# Patient Record
Sex: MALE | Race: WHITE | NOT HISPANIC OR LATINO | Employment: UNEMPLOYED | URBAN - METROPOLITAN AREA
[De-identification: names, ages, dates, MRNs, and addresses within clinical notes are randomized per-mention and may not be internally consistent; named-entity substitution may affect disease eponyms.]

---

## 2024-09-12 ENCOUNTER — HOSPITAL ENCOUNTER (OUTPATIENT)
Facility: HOSPITAL | Age: 59
Setting detail: OBSERVATION
Discharge: HOME | End: 2024-09-14
Attending: EMERGENCY MEDICINE | Admitting: INTERNAL MEDICINE
Payer: COMMERCIAL

## 2024-09-12 ENCOUNTER — APPOINTMENT (OUTPATIENT)
Dept: CT IMAGING | Facility: HOSPITAL | Age: 59
End: 2024-09-12
Payer: COMMERCIAL

## 2024-09-12 DIAGNOSIS — Z99.2 ESRD ON HEMODIALYSIS: ICD-10-CM

## 2024-09-12 DIAGNOSIS — G57.93 NEUROPATHY INVOLVING BOTH LOWER EXTREMITIES: ICD-10-CM

## 2024-09-12 DIAGNOSIS — N18.6 ESRD ON HEMODIALYSIS: ICD-10-CM

## 2024-09-12 DIAGNOSIS — R29.6 FREQUENT FALLS: Primary | ICD-10-CM

## 2024-09-12 DIAGNOSIS — M54.41 ACUTE MIDLINE LOW BACK PAIN WITH BILATERAL SCIATICA: ICD-10-CM

## 2024-09-12 DIAGNOSIS — M54.42 ACUTE MIDLINE LOW BACK PAIN WITH BILATERAL SCIATICA: ICD-10-CM

## 2024-09-12 LAB
ALBUMIN SERPL-MCNC: 2.9 G/DL (ref 3.5–5.2)
ALBUMIN/GLOB SERPL: 1.2 G/DL
ALP SERPL-CCNC: 142 U/L (ref 39–117)
ALT SERPL W P-5'-P-CCNC: 10 U/L (ref 1–41)
ANION GAP SERPL CALCULATED.3IONS-SCNC: 17.3 MMOL/L (ref 5–15)
AST SERPL-CCNC: 7 U/L (ref 1–40)
BASOPHILS # BLD AUTO: 0.04 10*3/MM3 (ref 0–0.2)
BASOPHILS NFR BLD AUTO: 0.8 % (ref 0–1.5)
BILIRUB SERPL-MCNC: 0.2 MG/DL (ref 0–1.2)
BUN SERPL-MCNC: 34 MG/DL (ref 6–20)
BUN/CREAT SERPL: 2 (ref 7–25)
CALCIUM SPEC-SCNC: 8.1 MG/DL (ref 8.6–10.5)
CHLORIDE SERPL-SCNC: 115 MMOL/L (ref 98–107)
CK SERPL-CCNC: 113 U/L (ref 20–200)
CO2 SERPL-SCNC: 12.7 MMOL/L (ref 22–29)
CREAT SERPL-MCNC: 16.88 MG/DL (ref 0.76–1.27)
DEPRECATED RDW RBC AUTO: 67.2 FL (ref 37–54)
EGFRCR SERPLBLD CKD-EPI 2021: 3 ML/MIN/1.73
EOSINOPHIL # BLD AUTO: 0.18 10*3/MM3 (ref 0–0.4)
EOSINOPHIL NFR BLD AUTO: 3.6 % (ref 0.3–6.2)
ERYTHROCYTE [DISTWIDTH] IN BLOOD BY AUTOMATED COUNT: 18.7 % (ref 12.3–15.4)
GLOBULIN UR ELPH-MCNC: 2.5 GM/DL
GLUCOSE SERPL-MCNC: 82 MG/DL (ref 65–99)
HCT VFR BLD AUTO: 32.6 % (ref 37.5–51)
HGB BLD-MCNC: 10.4 G/DL (ref 13–17.7)
IMM GRANULOCYTES # BLD AUTO: 0.01 10*3/MM3 (ref 0–0.05)
IMM GRANULOCYTES NFR BLD AUTO: 0.2 % (ref 0–0.5)
LYMPHOCYTES # BLD AUTO: 0.98 10*3/MM3 (ref 0.7–3.1)
LYMPHOCYTES NFR BLD AUTO: 19.8 % (ref 19.6–45.3)
MCH RBC QN AUTO: 31.7 PG (ref 26.6–33)
MCHC RBC AUTO-ENTMCNC: 31.9 G/DL (ref 31.5–35.7)
MCV RBC AUTO: 99.4 FL (ref 79–97)
MONOCYTES # BLD AUTO: 0.28 10*3/MM3 (ref 0.1–0.9)
MONOCYTES NFR BLD AUTO: 5.7 % (ref 5–12)
NEUTROPHILS NFR BLD AUTO: 3.46 10*3/MM3 (ref 1.7–7)
NEUTROPHILS NFR BLD AUTO: 69.9 % (ref 42.7–76)
NRBC BLD AUTO-RTO: 0 /100 WBC (ref 0–0.2)
PLATELET # BLD AUTO: 72 10*3/MM3 (ref 140–450)
PMV BLD AUTO: 9.2 FL (ref 6–12)
POTASSIUM SERPL-SCNC: 5.5 MMOL/L (ref 3.5–5.2)
PROT SERPL-MCNC: 5.4 G/DL (ref 6–8.5)
RBC # BLD AUTO: 3.28 10*6/MM3 (ref 4.14–5.8)
SODIUM SERPL-SCNC: 145 MMOL/L (ref 136–145)
WBC NRBC COR # BLD AUTO: 4.95 10*3/MM3 (ref 3.4–10.8)

## 2024-09-12 PROCEDURE — 36415 COLL VENOUS BLD VENIPUNCTURE: CPT | Performed by: PHYSICIAN ASSISTANT

## 2024-09-12 PROCEDURE — 99285 EMERGENCY DEPT VISIT HI MDM: CPT

## 2024-09-12 PROCEDURE — 72131 CT LUMBAR SPINE W/O DYE: CPT

## 2024-09-12 PROCEDURE — 82550 ASSAY OF CK (CPK): CPT | Performed by: PHYSICIAN ASSISTANT

## 2024-09-12 PROCEDURE — 80053 COMPREHEN METABOLIC PANEL: CPT | Performed by: PHYSICIAN ASSISTANT

## 2024-09-12 PROCEDURE — G0378 HOSPITAL OBSERVATION PER HR: HCPCS

## 2024-09-12 PROCEDURE — 85025 COMPLETE CBC W/AUTO DIFF WBC: CPT | Performed by: PHYSICIAN ASSISTANT

## 2024-09-12 PROCEDURE — 83036 HEMOGLOBIN GLYCOSYLATED A1C: CPT | Performed by: INTERNAL MEDICINE

## 2024-09-12 RX ORDER — HYDROCODONE BITARTRATE AND ACETAMINOPHEN 5; 325 MG/1; MG/1
1 TABLET ORAL EVERY 6 HOURS PRN
Status: DISCONTINUED | OUTPATIENT
Start: 2024-09-12 | End: 2024-09-14 | Stop reason: HOSPADM

## 2024-09-12 RX ORDER — LOPERAMIDE HCL 2 MG
2 CAPSULE ORAL 4 TIMES DAILY PRN
Status: DISCONTINUED | OUTPATIENT
Start: 2024-09-12 | End: 2024-09-14 | Stop reason: HOSPADM

## 2024-09-12 RX ORDER — BISACODYL 5 MG/1
5 TABLET, DELAYED RELEASE ORAL DAILY PRN
Status: DISCONTINUED | OUTPATIENT
Start: 2024-09-12 | End: 2024-09-14 | Stop reason: HOSPADM

## 2024-09-12 RX ORDER — SODIUM BICARBONATE 650 MG/1
650 TABLET ORAL
COMMUNITY
End: 2024-09-14 | Stop reason: HOSPADM

## 2024-09-12 RX ORDER — MIDODRINE HYDROCHLORIDE 5 MG/1
5 TABLET ORAL 3 TIMES DAILY
COMMUNITY
End: 2024-09-14 | Stop reason: HOSPADM

## 2024-09-12 RX ORDER — SODIUM CHLORIDE 0.9 % (FLUSH) 0.9 %
3 SYRINGE (ML) INJECTION EVERY 12 HOURS SCHEDULED
Status: DISCONTINUED | OUTPATIENT
Start: 2024-09-12 | End: 2024-09-14 | Stop reason: HOSPADM

## 2024-09-12 RX ORDER — CIMETIDINE 200 MG
TABLET ORAL
COMMUNITY
Start: 2024-08-27 | End: 2024-09-14 | Stop reason: HOSPADM

## 2024-09-12 RX ORDER — OXYCODONE AND ACETAMINOPHEN 5; 325 MG/1; MG/1
1 TABLET ORAL ONCE
Status: COMPLETED | OUTPATIENT
Start: 2024-09-12 | End: 2024-09-12

## 2024-09-12 RX ORDER — SODIUM CHLORIDE 0.9 % (FLUSH) 0.9 %
3-10 SYRINGE (ML) INJECTION AS NEEDED
Status: DISCONTINUED | OUTPATIENT
Start: 2024-09-12 | End: 2024-09-14 | Stop reason: HOSPADM

## 2024-09-12 RX ORDER — AMOXICILLIN 250 MG
2 CAPSULE ORAL 2 TIMES DAILY
Status: DISCONTINUED | OUTPATIENT
Start: 2024-09-12 | End: 2024-09-14 | Stop reason: HOSPADM

## 2024-09-12 RX ORDER — ONDANSETRON 4 MG/1
4 TABLET, ORALLY DISINTEGRATING ORAL EVERY 6 HOURS PRN
Status: DISCONTINUED | OUTPATIENT
Start: 2024-09-12 | End: 2024-09-14 | Stop reason: HOSPADM

## 2024-09-12 RX ORDER — ONDANSETRON 2 MG/ML
4 INJECTION INTRAMUSCULAR; INTRAVENOUS EVERY 6 HOURS PRN
Status: DISCONTINUED | OUTPATIENT
Start: 2024-09-12 | End: 2024-09-14 | Stop reason: HOSPADM

## 2024-09-12 RX ORDER — POTASSIUM CHLORIDE 750 MG/1
10 TABLET, EXTENDED RELEASE ORAL
COMMUNITY
Start: 2024-08-26 | End: 2024-09-14 | Stop reason: HOSPADM

## 2024-09-12 RX ORDER — LIDOCAINE 40 MG/G
CREAM TOPICAL
Status: ON HOLD | COMMUNITY
Start: 2024-09-09 | End: 2024-09-13

## 2024-09-12 RX ORDER — FAMOTIDINE 20 MG/1
10 TABLET, FILM COATED ORAL 2 TIMES DAILY PRN
Status: DISCONTINUED | OUTPATIENT
Start: 2024-09-12 | End: 2024-09-14 | Stop reason: HOSPADM

## 2024-09-12 RX ORDER — GABAPENTIN 300 MG/1
CAPSULE ORAL
Status: ON HOLD | COMMUNITY
Start: 2024-09-10 | End: 2024-09-13

## 2024-09-12 RX ORDER — ENOXAPARIN SODIUM 100 MG/ML
30 INJECTION SUBCUTANEOUS DAILY
Status: DISCONTINUED | OUTPATIENT
Start: 2024-09-13 | End: 2024-09-13

## 2024-09-12 RX ORDER — GABAPENTIN 100 MG/1
CAPSULE ORAL
COMMUNITY
Start: 2024-08-28

## 2024-09-12 RX ORDER — ACETAMINOPHEN 650 MG/1
650 SUPPOSITORY RECTAL EVERY 4 HOURS PRN
Status: DISCONTINUED | OUTPATIENT
Start: 2024-09-12 | End: 2024-09-14 | Stop reason: HOSPADM

## 2024-09-12 RX ORDER — ACETAMINOPHEN 325 MG/1
650 TABLET ORAL EVERY 4 HOURS PRN
Status: DISCONTINUED | OUTPATIENT
Start: 2024-09-12 | End: 2024-09-14 | Stop reason: HOSPADM

## 2024-09-12 RX ORDER — ACETAMINOPHEN 160 MG/5ML
650 SOLUTION ORAL EVERY 4 HOURS PRN
Status: DISCONTINUED | OUTPATIENT
Start: 2024-09-12 | End: 2024-09-14 | Stop reason: HOSPADM

## 2024-09-12 RX ORDER — POLYETHYLENE GLYCOL 3350 17 G/17G
17 POWDER, FOR SOLUTION ORAL DAILY PRN
Status: DISCONTINUED | OUTPATIENT
Start: 2024-09-12 | End: 2024-09-14 | Stop reason: HOSPADM

## 2024-09-12 RX ORDER — BISACODYL 10 MG
10 SUPPOSITORY, RECTAL RECTAL DAILY PRN
Status: DISCONTINUED | OUTPATIENT
Start: 2024-09-12 | End: 2024-09-14 | Stop reason: HOSPADM

## 2024-09-12 RX ORDER — SODIUM CHLORIDE 9 MG/ML
40 INJECTION, SOLUTION INTRAVENOUS AS NEEDED
Status: DISCONTINUED | OUTPATIENT
Start: 2024-09-12 | End: 2024-09-14 | Stop reason: HOSPADM

## 2024-09-12 RX ADMIN — OXYCODONE HYDROCHLORIDE AND ACETAMINOPHEN 1 TABLET: 5; 325 TABLET ORAL at 19:00

## 2024-09-12 RX ADMIN — Medication 3 ML: at 23:11

## 2024-09-12 NOTE — ED PROVIDER NOTES
EMERGENCY DEPARTMENT ENCOUNTER  Room Number:  S04/04  PCP: Zahida Sanchez APRN  Independent Historians: Patient      HPI:  Chief Complaint: had concerns including Foot Pain.     A complete HPI/ROS/PMH/PSH/SH/FH are unobtainable due to: None    Chronic or social conditions impacting patient care (Social Determinants of Health): None      Context: The patient is a 58 y.o. male with a medical history of ESRD on dialysis, diabetes who presents to the ED c/o acute bilateral lower extremity pain and back pain.  He states that has been present for a couple of weeks, is gradually worsening.  He states he is fallen 5 times in the last 3 days, feels he is having weakness in his legs.  He feels a burning icy pain in his feet.  He states he was discharged from another hospital couple days ago and since then he is fallen multiple times and has had no relief of his symptoms.  He goes to dialysis Monday Wednesday Friday, is due for dialysis tomorrow.      Review of prior external notes (non-ED) -and- Review of prior external test results outside of this encounter:  Patient evaluated at [facility] for bilateral lower extremity pain x 3 days on 8/26/2024.  Reported radiate up his legs and into his back and increased pain and tingling with walking.  Hemoglobin 11.8, potassium 2.6, CK was normal, potassium and magnesium replaced.  He was evaluated again on 8/29/2024 at [facility] for bilateral feet pain to lower back.  ABIs were normal.  Repeat labs unremarkable.  Diagnosed with nephropathy, prescribed gabapentin.        PAST MEDICAL HISTORY  Active Ambulatory Problems     Diagnosis Date Noted    No Active Ambulatory Problems     Resolved Ambulatory Problems     Diagnosis Date Noted    No Resolved Ambulatory Problems     Past Medical History:   Diagnosis Date    Diabetes mellitus     ESRD (end stage renal disease) on dialysis     Renal disorder          PAST SURGICAL HISTORY  History reviewed. No pertinent surgical  history.      FAMILY HISTORY  History reviewed. No pertinent family history.      SOCIAL HISTORY  Social History     Socioeconomic History    Marital status: Single         ALLERGIES  Ibuprofen      REVIEW OF SYSTEMS  Review of Systems  Included in HPI  All systems reviewed and negative except for those discussed in HPI.      PHYSICAL EXAM    I have reviewed the triage vital signs and nursing notes.    ED Triage Vitals   Temp Heart Rate Resp BP SpO2   09/12/24 1415 09/12/24 1415 09/12/24 1415 09/12/24 1415 09/12/24 1415   98.5 °F (36.9 °C) 77 16 170/90 100 %      Temp src Heart Rate Source Patient Position BP Location FiO2 (%)   -- 09/12/24 1510 09/12/24 1510 09/12/24 1510 --    Monitor Sitting Right arm        Physical Exam  GENERAL: alert, no acute distress  SKIN: Warm, dry  HENT: Normocephalic, atraumatic  EYES: no scleral icterus  CV: regular rhythm, regular rate  RESPIRATORY: normal effort, lungs clear  ABDOMEN: nondistended soft nontender  MUSCULOSKELETAL: no deformity.Sensation is intact to light touch throughout the bilateral lower extremities. Muscle strength is 5/5 and with hip and knee flexion and extension, 3+/5 and symmetrical with plantarflexion and EHL. Patellar reflexes are 1+ and equal bilaterally. DP and PT pulses are 2+ bilaterally.  Feet are warm, natural color, cap refill brisk  NEURO: alert, moves all extremities, follows commands            LAB RESULTS  Recent Results (from the past 24 hour(s))   Comprehensive Metabolic Panel    Collection Time: 09/12/24  4:33 PM    Specimen: Arm, Right; Blood   Result Value Ref Range    Glucose 82 65 - 99 mg/dL    BUN 34 (H) 6 - 20 mg/dL    Creatinine 16.88 (H) 0.76 - 1.27 mg/dL    Sodium 145 136 - 145 mmol/L    Potassium 5.5 (H) 3.5 - 5.2 mmol/L    Chloride 115 (H) 98 - 107 mmol/L    CO2 12.7 (L) 22.0 - 29.0 mmol/L    Calcium 8.1 (L) 8.6 - 10.5 mg/dL    Total Protein 5.4 (L) 6.0 - 8.5 g/dL    Albumin 2.9 (L) 3.5 - 5.2 g/dL    ALT (SGPT) 10 1 - 41 U/L    AST  (SGOT) 7 1 - 40 U/L    Alkaline Phosphatase 142 (H) 39 - 117 U/L    Total Bilirubin 0.2 0.0 - 1.2 mg/dL    Globulin 2.5 gm/dL    A/G Ratio 1.2 g/dL    BUN/Creatinine Ratio 2.0 (L) 7.0 - 25.0    Anion Gap 17.3 (H) 5.0 - 15.0 mmol/L    eGFR 3.0 (L) >60.0 mL/min/1.73   CK    Collection Time: 09/12/24  4:33 PM    Specimen: Arm, Right; Blood   Result Value Ref Range    Creatine Kinase 113 20 - 200 U/L   CBC Auto Differential    Collection Time: 09/12/24  4:33 PM    Specimen: Arm, Right; Blood   Result Value Ref Range    WBC 4.95 3.40 - 10.80 10*3/mm3    RBC 3.28 (L) 4.14 - 5.80 10*6/mm3    Hemoglobin 10.4 (L) 13.0 - 17.7 g/dL    Hematocrit 32.6 (L) 37.5 - 51.0 %    MCV 99.4 (H) 79.0 - 97.0 fL    MCH 31.7 26.6 - 33.0 pg    MCHC 31.9 31.5 - 35.7 g/dL    RDW 18.7 (H) 12.3 - 15.4 %    RDW-SD 67.2 (H) 37.0 - 54.0 fl    MPV 9.2 6.0 - 12.0 fL    Platelets 72 (L) 140 - 450 10*3/mm3    Neutrophil % 69.9 42.7 - 76.0 %    Lymphocyte % 19.8 19.6 - 45.3 %    Monocyte % 5.7 5.0 - 12.0 %    Eosinophil % 3.6 0.3 - 6.2 %    Basophil % 0.8 0.0 - 1.5 %    Immature Grans % 0.2 0.0 - 0.5 %    Neutrophils, Absolute 3.46 1.70 - 7.00 10*3/mm3    Lymphocytes, Absolute 0.98 0.70 - 3.10 10*3/mm3    Monocytes, Absolute 0.28 0.10 - 0.90 10*3/mm3    Eosinophils, Absolute 0.18 0.00 - 0.40 10*3/mm3    Basophils, Absolute 0.04 0.00 - 0.20 10*3/mm3    Immature Grans, Absolute 0.01 0.00 - 0.05 10*3/mm3    nRBC 0.0 0.0 - 0.2 /100 WBC         RADIOLOGY  No Radiology Exams Resulted Within Past 24 Hours      MEDICATIONS GIVEN IN ER  Medications   oxyCODONE-acetaminophen (PERCOCET) 5-325 MG per tablet 1 tablet (1 tablet Oral Given 9/12/24 1900)         ORDERS PLACED DURING THIS VISIT:  Orders Placed This Encounter   Procedures    CT Lumbar Spine Without Contrast    Comprehensive Metabolic Panel    CK    CBC Auto Differential    [facility] (on-call MD unless specified) Details    CBC & Differential         OUTPATIENT MEDICATION MANAGEMENT:  No current Epic-ordered  facility-administered medications on file.     Current Outpatient Medications Ordered in Epic   Medication Sig Dispense Refill    Antacid Extra Strength 750 MG chewable tablet       gabapentin (NEURONTIN) 100 MG capsule       gabapentin (NEURONTIN) 300 MG capsule       lidocaine (LMX) 4 % cream       potassium chloride 10 MEQ CR tablet Take 1 tablet by mouth.      midodrine (PROAMATINE) 5 MG tablet Take 1 tablet by mouth 3 (Three) Times a Day.      sodium bicarbonate 650 MG tablet Take 1 tablet by mouth.           PROCEDURES  Procedures            PROGRESS, DATA ANALYSIS, CONSULTS, AND MEDICAL DECISION MAKING  All labs have been independently interpreted by me.  All radiology studies have been reviewed by me. All EKG's have been independently viewed and interpreted by me.  Discussion below represents my analysis of pertinent findings related to patient's condition, differential diagnosis, treatment plan and final disposition.    DIFFERENTIAL    My differential diagnosis includes but is not limited to neuropathy, radiculopathy, cauda equina, peripheral vascular disease    Clinical Scores:                  ED Course as of 09/13/24 1552   Thu Sep 12, 2024   1709 Creatine Kinase: 113 [KA]   1843 I discussed patient with Dr. Gunter, hospitalist including history presentation workup.  He would like the patient to have a CT lumbar spine in the emergency department and then call him back [KA]   6211 Dr. Gunter in the department to see patient.  Patient's CT scan with degenerative disc disease.  Initial plan was for MRI lumbar spine in the morning and neurology or neurosurgery consultation [AR]      ED Course User Index  [AR] Miroslava Patel MD  [KA] Claritza Calix PA-C             AS OF 19:53 EDT VITALS:    BP - 152/89  HR - 69  TEMP - 98.5 °F (36.9 °C)  O2 SATS - 100%    COMPLEXITY OF CARE  The patient requires admission.      DIAGNOSIS  Final diagnoses:   Frequent falls   Acute midline low back pain with bilateral  sciatica   Neuropathy involving both lower extremities   ESRD on hemodialysis         DISPOSITION  ED Disposition       ED Disposition   Decision to Admit    Condition   --    Comment   Level of Care: Med/Surg [1]   Diagnosis: Frequent falls [510450]   Admitting Physician: GERMANIA BARBA [[id number]]   Attending Physician: GERMANIA BARBA [[id number]]                          Please note that portions of this document were completed with a voice recognition program.    Note Disclaimer: At [facility], we believe that sharing information builds trust and better relationships. You are receiving this note because you recently visited [facility]. It is possible you will see health information before a provider has talked with you about it. This kind of information can be easy to misunderstand. To help you fully understand what it means for your health, we urge you to discuss this note with your provider.         Claritza Calix PA-C  09/13/24 1551

## 2024-09-13 PROBLEM — D69.6 THROMBOCYTOPENIA: Status: ACTIVE | Noted: 2024-09-13

## 2024-09-13 PROBLEM — E87.20 METABOLIC ACIDOSIS: Status: ACTIVE | Noted: 2024-09-13

## 2024-09-13 PROBLEM — D53.9 MACROCYTIC ANEMIA: Status: ACTIVE | Noted: 2024-09-13

## 2024-09-13 PROBLEM — G62.9 NEUROPATHY: Status: ACTIVE | Noted: 2024-09-13

## 2024-09-13 PROBLEM — E53.8 B12 DEFICIENCY: Status: ACTIVE | Noted: 2024-09-13

## 2024-09-13 PROBLEM — R20.0 BILATERAL LEG NUMBNESS: Status: ACTIVE | Noted: 2024-09-13

## 2024-09-13 PROBLEM — Z86.2 HISTORY OF ANEMIA DUE TO VITAMIN B12 DEFICIENCY: Status: ACTIVE | Noted: 2024-09-13

## 2024-09-13 PROBLEM — R53.1 GENERAL WEAKNESS: Status: ACTIVE | Noted: 2024-09-13

## 2024-09-13 PROBLEM — E87.6 HYPOKALEMIA: Status: ACTIVE | Noted: 2024-09-13

## 2024-09-13 PROBLEM — E87.5 HYPERKALEMIA: Status: ACTIVE | Noted: 2024-09-13

## 2024-09-13 PROBLEM — F10.21 HISTORY OF ALCOHOL DEPENDENCE: Status: ACTIVE | Noted: 2024-09-13

## 2024-09-13 LAB
ANION GAP SERPL CALCULATED.3IONS-SCNC: 22 MMOL/L (ref 5–15)
BUN SERPL-MCNC: 39 MG/DL (ref 6–20)
BUN/CREAT SERPL: 2.3 (ref 7–25)
CALCIUM SPEC-SCNC: 7.4 MG/DL (ref 8.6–10.5)
CHLORIDE SERPL-SCNC: 110 MMOL/L (ref 98–107)
CO2 SERPL-SCNC: 10 MMOL/L (ref 22–29)
CREAT SERPL-MCNC: 16.73 MG/DL (ref 0.76–1.27)
DEPRECATED RDW RBC AUTO: 69.7 FL (ref 37–54)
EGFRCR SERPLBLD CKD-EPI 2021: 3 ML/MIN/1.73
ERYTHROCYTE [DISTWIDTH] IN BLOOD BY AUTOMATED COUNT: 18.7 % (ref 12.3–15.4)
FOLATE SERPL-MCNC: 5.44 NG/ML (ref 4.78–24.2)
GLUCOSE BLDC GLUCOMTR-MCNC: 111 MG/DL (ref 70–130)
GLUCOSE BLDC GLUCOMTR-MCNC: 81 MG/DL (ref 70–130)
GLUCOSE BLDC GLUCOMTR-MCNC: 96 MG/DL (ref 70–130)
GLUCOSE BLDC GLUCOMTR-MCNC: 99 MG/DL (ref 70–130)
GLUCOSE SERPL-MCNC: 96 MG/DL (ref 65–99)
HBA1C MFR BLD: 4.5 % (ref 4.8–5.6)
HBV SURFACE AG SERPL QL IA: NORMAL
HCT VFR BLD AUTO: 28.6 % (ref 37.5–51)
HGB BLD-MCNC: 8.7 G/DL (ref 13–17.7)
MAGNESIUM SERPL-MCNC: 2 MG/DL (ref 1.6–2.6)
MCH RBC QN AUTO: 31.1 PG (ref 26.6–33)
MCHC RBC AUTO-ENTMCNC: 30.4 G/DL (ref 31.5–35.7)
MCV RBC AUTO: 102.1 FL (ref 79–97)
PHOSPHATE SERPL-MCNC: 11.2 MG/DL (ref 2.5–4.5)
PLATELET # BLD AUTO: 81 10*3/MM3 (ref 140–450)
PMV BLD AUTO: 10 FL (ref 6–12)
POTASSIUM SERPL-SCNC: 4.7 MMOL/L (ref 3.5–5.2)
RBC # BLD AUTO: 2.8 10*6/MM3 (ref 4.14–5.8)
SODIUM SERPL-SCNC: 142 MMOL/L (ref 136–145)
VIT B12 BLD-MCNC: <150 PG/ML (ref 211–946)
WBC NRBC COR # BLD AUTO: 5.07 10*3/MM3 (ref 3.4–10.8)

## 2024-09-13 PROCEDURE — 87340 HEPATITIS B SURFACE AG IA: CPT | Performed by: INTERNAL MEDICINE

## 2024-09-13 PROCEDURE — 25010000002 CYANOCOBALAMIN PER 1000 MCG: Performed by: INTERNAL MEDICINE

## 2024-09-13 PROCEDURE — 82948 REAGENT STRIP/BLOOD GLUCOSE: CPT

## 2024-09-13 PROCEDURE — 97162 PT EVAL MOD COMPLEX 30 MIN: CPT

## 2024-09-13 PROCEDURE — 80048 BASIC METABOLIC PNL TOTAL CA: CPT | Performed by: INTERNAL MEDICINE

## 2024-09-13 PROCEDURE — 96375 TX/PRO/DX INJ NEW DRUG ADDON: CPT

## 2024-09-13 PROCEDURE — 99204 OFFICE O/P NEW MOD 45 MIN: CPT | Performed by: NURSE PRACTITIONER

## 2024-09-13 PROCEDURE — 84100 ASSAY OF PHOSPHORUS: CPT | Performed by: INTERNAL MEDICINE

## 2024-09-13 PROCEDURE — G0378 HOSPITAL OBSERVATION PER HR: HCPCS

## 2024-09-13 PROCEDURE — 82746 ASSAY OF FOLIC ACID SERUM: CPT | Performed by: INTERNAL MEDICINE

## 2024-09-13 PROCEDURE — 96365 THER/PROPH/DIAG IV INF INIT: CPT

## 2024-09-13 PROCEDURE — 97530 THERAPEUTIC ACTIVITIES: CPT

## 2024-09-13 PROCEDURE — 85027 COMPLETE CBC AUTOMATED: CPT | Performed by: INTERNAL MEDICINE

## 2024-09-13 PROCEDURE — 83735 ASSAY OF MAGNESIUM: CPT | Performed by: INTERNAL MEDICINE

## 2024-09-13 PROCEDURE — 82607 VITAMIN B-12: CPT | Performed by: INTERNAL MEDICINE

## 2024-09-13 PROCEDURE — 96372 THER/PROPH/DIAG INJ SC/IM: CPT

## 2024-09-13 PROCEDURE — 25010000002 EPOETIN ALFA-EPBX 10000 UNIT/ML SOLUTION: Performed by: INTERNAL MEDICINE

## 2024-09-13 PROCEDURE — G0257 UNSCHED DIALYSIS ESRD PT HOS: HCPCS

## 2024-09-13 RX ORDER — CYANOCOBALAMIN 1000 UG/ML
1000 INJECTION, SOLUTION INTRAMUSCULAR; SUBCUTANEOUS DAILY
Status: DISCONTINUED | OUTPATIENT
Start: 2024-09-13 | End: 2024-09-14 | Stop reason: HOSPADM

## 2024-09-13 RX ORDER — GABAPENTIN 100 MG/1
100 CAPSULE ORAL NIGHTLY
Status: DISCONTINUED | OUTPATIENT
Start: 2024-09-13 | End: 2024-09-14 | Stop reason: HOSPADM

## 2024-09-13 RX ORDER — MIDODRINE HYDROCHLORIDE 5 MG/1
5 TABLET ORAL
Status: DISCONTINUED | OUTPATIENT
Start: 2024-09-13 | End: 2024-09-13

## 2024-09-13 RX ORDER — DEXTROSE MONOHYDRATE 25 G/50ML
25 INJECTION, SOLUTION INTRAVENOUS
Status: DISCONTINUED | OUTPATIENT
Start: 2024-09-13 | End: 2024-09-14 | Stop reason: HOSPADM

## 2024-09-13 RX ORDER — NICOTINE POLACRILEX 4 MG
15 LOZENGE BUCCAL
Status: DISCONTINUED | OUTPATIENT
Start: 2024-09-13 | End: 2024-09-14 | Stop reason: HOSPADM

## 2024-09-13 RX ORDER — FOLIC ACID 1 MG/1
1 TABLET ORAL DAILY
Status: DISCONTINUED | OUTPATIENT
Start: 2024-09-13 | End: 2024-09-14 | Stop reason: HOSPADM

## 2024-09-13 RX ORDER — SEVELAMER CARBONATE 800 MG/1
2400 TABLET, FILM COATED ORAL
Status: DISCONTINUED | OUTPATIENT
Start: 2024-09-13 | End: 2024-09-14 | Stop reason: HOSPADM

## 2024-09-13 RX ORDER — INSULIN LISPRO 100 [IU]/ML
2-7 INJECTION, SOLUTION INTRAVENOUS; SUBCUTANEOUS
Status: DISCONTINUED | OUTPATIENT
Start: 2024-09-13 | End: 2024-09-14 | Stop reason: HOSPADM

## 2024-09-13 RX ORDER — IBUPROFEN 600 MG/1
1 TABLET ORAL
Status: DISCONTINUED | OUTPATIENT
Start: 2024-09-13 | End: 2024-09-14 | Stop reason: HOSPADM

## 2024-09-13 RX ADMIN — EPOETIN ALFA-EPBX 10000 UNITS: 10000 INJECTION, SOLUTION INTRAVENOUS; SUBCUTANEOUS at 13:47

## 2024-09-13 RX ADMIN — CYANOCOBALAMIN 1000 MCG: 1000 INJECTION INTRAMUSCULAR; SUBCUTANEOUS at 09:23

## 2024-09-13 RX ADMIN — HYDROCODONE BITARTRATE AND ACETAMINOPHEN 1 TABLET: 5; 325 TABLET ORAL at 20:02

## 2024-09-13 RX ADMIN — SEVELAMER CARBONATE 2400 MG: 800 TABLET, FILM COATED ORAL at 13:47

## 2024-09-13 RX ADMIN — Medication 3 ML: at 09:37

## 2024-09-13 RX ADMIN — ACETAMINOPHEN 325MG 650 MG: 325 TABLET ORAL at 01:23

## 2024-09-13 RX ADMIN — FOLIC ACID 1 MG: 5 INJECTION, SOLUTION INTRAMUSCULAR; INTRAVENOUS; SUBCUTANEOUS at 11:14

## 2024-09-13 RX ADMIN — SENNOSIDES AND DOCUSATE SODIUM 2 TABLET: 50; 8.6 TABLET ORAL at 20:07

## 2024-09-13 RX ADMIN — GABAPENTIN 100 MG: 100 CAPSULE ORAL at 20:07

## 2024-09-13 RX ADMIN — Medication 3 ML: at 20:08

## 2024-09-13 RX ADMIN — Medication 2.5 MG: at 20:07

## 2024-09-13 RX ADMIN — SENNOSIDES AND DOCUSATE SODIUM 2 TABLET: 50; 8.6 TABLET ORAL at 09:23

## 2024-09-13 RX ADMIN — GABAPENTIN 100 MG: 100 CAPSULE ORAL at 01:23

## 2024-09-13 RX ADMIN — HYDROCODONE BITARTRATE AND ACETAMINOPHEN 1 TABLET: 5; 325 TABLET ORAL at 03:46

## 2024-09-13 RX ADMIN — HYDROCODONE BITARTRATE AND ACETAMINOPHEN 1 TABLET: 5; 325 TABLET ORAL at 09:23

## 2024-09-13 RX ADMIN — FOLIC ACID 1 MG: 1 TABLET ORAL at 09:23

## 2024-09-13 NOTE — H&P
[facility] Medicine Services  HISTORY AND PHYSICAL    Patient Name: Viet Guerra Jr.  : 1965  MRN: [id number]  Primary Care Physician: Zahida Sanchez APRN  Date of admission: 2024    Subjective   Subjective   Chief Complaint:  Multiple falls, acute on chronic leg weakness and numbness    HPI:  Viet Guerra Jr. is a 58 y.o. male with past medical history of reported end-stage renal disease with reported chronic leg numbness presents to the hospital with worsening bilateral leg pain and generalized leg weakness with increasing numbness.  Patient reports multiple falls but no major injuries.  Patient reports being evaluated at other emergency room's and sent home.  Patient reports he goes to dialysis  and is due for dialysis tomorrow.  He feels he is unable to care for himself in his current state and reports he lives with family.  No reported seizures and unilateral weakness, unilateral numbness, vision changes      Review of Systems   No current fevers or chills  No current shortness of breath or cough  No current nausea, vomiting, or diarrhea  No current chest pain or palpitations    All other systems reviewed and are negative.     Personal History     Past Medical History:   Diagnosis Date    Diabetes mellitus     ESRD (end stage renal disease) on dialysis     Renal disorder        History reviewed. No pertinent surgical history.    Family History: family history is not on file. Other pertinent FHx was reviewed and unremarkable.     Social History:  reports that he has quit smoking. His smoking use included cigarettes. He has quit using smokeless tobacco. Alcohol use questions deferred to the physician. He reports that he does not currently use drugs.        Medications:  Available home medication information reviewed.    Allergies   Allergen Reactions    Ibuprofen Other (See Comments)     Other reaction(s): Other (See Comments)   R/T ESRD     R/T ESRD        Objective   Objective   Vital Signs:   Temp:  [97.7 °F (36.5 °C)-98.5 °F (36.9 °C)] 97.7 °F (36.5 °C)  Heart Rate:  [67-77] 67  Resp:  [16-18] 18  BP: (141-170)/(83-95) 141/95        Physical Exam   Constitutional:Awake, alert, appears older than stated age, somewhat chronically ill-appearing  HENT: NCAT, mucous membranes moist, neck supple  Respiratory: No cough or wheezes, normal respirations, nonlabored breathing   Cardiovascular: Pulse rate is normal, palpable radial pulses  Gastrointestinal:  soft, nontender, nondistended  Musculoskeletal: Debilitated appearance, thin, some muscle wasting is noted, BMI is 20, 4 out of 5 strength bilaterally, reports numbness bilaterally  Psychiatric: Appropriate affect, cooperative, conversational  Neurologic: No slurred speech or facial droop, follows commands  Skin: No rashes or jaundice, warm      Results from last 7 days   Lab Units 09/12/24  1633   WBC 10*3/mm3 4.95   HEMOGLOBIN g/dL 10.4*   HEMATOCRIT % 32.6*   PLATELETS 10*3/mm3 72*     Results from last 7 days   Lab Units 09/12/24  1633   SODIUM mmol/L 145   POTASSIUM mmol/L 5.5*   CHLORIDE mmol/L 115*   CO2 mmol/L 12.7*   BUN mg/dL 34*   CREATININE mg/dL 16.88*   GLUCOSE mg/dL 82   CALCIUM mg/dL 8.1*   ALK PHOS U/L 142*   ALT (SGPT) U/L 10   AST (SGOT) U/L 7     Estimated Creatinine Clearance: 4.7 mL/min (A) (by C-G formula based on SCr of 16.88 mg/dL (H)).  Brief Urine Lab Results       None          Imaging Results (Last 24 Hours)       Procedure Component Value Units Date/Time    CT Lumbar Spine Without Contrast [[id number]] Collected: 09/12/24 2054     Updated: 09/12/24 2103    Narrative:      CT LUMBAR SPINE WO CONTRAST-     DATE OF EXAM: 09/12/2024 7:21 PM     INDICATION: low back pain radiating to legs. Bilateral lower extremity  pain for 3 days. Increased pain with tingling with walking.     COMPARISON: None available.     TECHNIQUE: Multiple contiguous axial images were acquired through the  lumbar spine  without the intravenous administration of contrast.  Reformatted coronal and sagittal sequences were also reviewed. Radiation  dose reduction techniques were utilized, including automated exposure  control and exposure modulation based on body size.     FINDINGS:  Normal variant L3 limbus vertebra. Multilevel endplate irregularities,  consistent with Schmorl's nodes. The lumbar vertebral bodies otherwise  demonstrate well preserved height and alignment. No evidence of acute  fracture of the lumbar spine. No concerning osseous lesion.     L1-L2: Mild bilateral facet and ligamentous hypertrophy, without  significant spinal canal stenosis or neural foraminal narrowing.  L2-L3: Mild diffuse disc bulge with bilateral facet hypertrophy.  Findings result in mild indentation of the thecal sac and moderate to  severe bilateral neural foraminal narrowing.  L3-L4: Diffuse disc bulge with bilateral facet hypertrophy. Findings  result in mild indentation of the thecal sac and mild bilateral neural  foraminal narrowing.  L4-L5: Diffuse disc bulge with bilateral facet hypertrophy. Findings  result in mild indentation of the thecal sac and mild bilateral neural  renal narrowing.  L5-S1: Diffuse disc bulge with bilateral facet hypertrophy. Findings  result in mild bilateral neural foraminal narrowing. No significant  spinal canal stenosis.     The paraspinal soft tissues are unremarkable. Bilateral renal  parenchymal atrophy with multiple nonobstructing calcifications in both  kidneys. Postoperative changes from cholecystectomy. Moderate to severe  calcified atherosclerotic disease in the abdominal aorta and its  branches without evidence of aneurysm.       Impression:         1. No acute fracture or subluxation of the lumbar spine.  2. Multilevel lumbar spondylosis, most prominent at L2-L3, as detailed  above.  3. Bilateral renal parenchymal atrophy with multiple nonobstructing  calcifications in both kidneys.  4. Moderate to  severe calcified atherosclerotic disease.     This report was finalized on 9/12/2024 9:00 PM by Donald Henderson MD on  Workstation: [id number]                 Assessment & Plan   Assessment & Plan     Active Hospital Problems    Diagnosis  POA    **Frequent falls [R29.6]  Not Applicable    Bilateral leg numbness, acute on chronic [R20.0]  Yes    General weakness [R53.1]  Yes    History of alcohol dependence, reports 5-year sobriety [F10.21]  Yes    History of vitamin B12 deficiency [Z86.2]  Not Applicable    Neuropathy [G62.9]  Yes    Macrocytic anemia [D53.9]  Yes    Metabolic acidosis [E87.20]  Yes    Hyperkalemia [E87.5]  Yes    Thrombocytopenia [D69.6]  Yes    ESRD (end stage renal disease) on dialysis [N18.6, Z99.2]  Not Applicable     58-year-old male with end-stage renal disease on dialysis presents to the hospital with frequent falls and acute on chronic bilateral leg numbness.    Falls and leg numbness with neuropathy:  Consult neurology.  Previous records reviewed and previous B12 deficiency noted.  Check vitamin B12.  Folic acid.  A1c.  Consult neurology to assist with workup.  PT OT for physical debility.  Supportive care and symptom treatment.  No clear focal symptoms at this time.  This is gradual and progressive and bilateral.  Patient reports significant alcohol dependence in the past but recent sobriety alcohol could be contributing to neuropathy.  Continue low-dose gabapentin.    Macrocytic anemia:  Check B12 and folic acid .  No bleeding reported    Hyperkalemia: Give Lokelma now.  Consult nephrology.  Likely will improve with dialysis and trend    End-stage renal disease on hemodialysis with metabolic acidosis: Consult nephrology.  Monitor electrolytes.    Thrombocytopenia: No active bleeding noted.  Trend platelets.  Previous records reviewed and this appears to be a more chronic issue.    History of alcohol dependence: Patient reports he has been sober for 5 years.    DVT prophylaxis:  SCD    CODE STATUS:    Code Status and Medical Interventions: CPR (Attempt to Resuscitate); Full Support   Ordered at: 09/12/24 5207     Level Of Support Discussed With:    Patient     Code Status (Patient has no pulse and is not breathing):    CPR (Attempt to Resuscitate)     Medical Interventions (Patient has pulse or is breathing):    Full Support         Dimitrios Gunter MD  09/13/24  Service provided on 9/12/2024 at approximately 2300

## 2024-09-13 NOTE — PROGRESS NOTES
Name: Viet Guerra Jr. ADMIT: 2024   : 1965  PCP: Zahida Sanchez APRN    MRN: [id number] LOS: 0 days   AGE/SEX: 58 y.o. male  ROOM: [facility]     Subjective   Subjective   Seen while on dialysis.  Feels somewhat better.       Objective   Objective   Vital Signs  Temp:  [97.7 °F (36.5 °C)-97.9 °F (36.6 °C)] 97.9 °F (36.6 °C)  Heart Rate:  [67-76] 76  Resp:  [16-18] 18  BP: (141-148)/(81-95) 146/82  SpO2:  [99 %-100 %] 99 %  on   ;   Device (Oxygen Therapy): room air  Body mass index is 20.78 kg/m².  Physical Exam  Vitals and nursing note reviewed.   Constitutional:       General: He is not in acute distress.     Appearance: He is ill-appearing.   Cardiovascular:      Rate and Rhythm: Normal rate and regular rhythm.   Pulmonary:      Effort: Pulmonary effort is normal.      Breath sounds: Normal breath sounds.   Abdominal:      General: Bowel sounds are normal.      Palpations: Abdomen is soft.      Tenderness: There is no abdominal tenderness.   Musculoskeletal:         General: No swelling.      Right lower leg: Edema present.      Left lower leg: Edema present.   Skin:     General: Skin is warm and dry.      Coloration: Skin is pale.   Neurological:      Mental Status: He is alert. Mental status is at baseline.       Results Review     I reviewed the patient's new clinical results.  Results from last 7 days   Lab Units 24  0427 24  1633   WBC 10*3/mm3 5.07 4.95   HEMOGLOBIN g/dL 8.7* 10.4*   PLATELETS 10*3/mm3 81* 72*     Results from last 7 days   Lab Units 24  0427 24  1633   SODIUM mmol/L 142 145   POTASSIUM mmol/L 4.7 5.5*   CHLORIDE mmol/L 110* 115*   CO2 mmol/L 10.0* 12.7*   BUN mg/dL 39* 34*   CREATININE mg/dL 16.73* 16.88*   GLUCOSE mg/dL 96 82   EGFR mL/min/1.73 3.0* 3.0*     Results from last 7 days   Lab Units 24  1633   ALBUMIN g/dL 2.9*   BILIRUBIN mg/dL 0.2   ALK PHOS U/L 142*   AST (SGOT) U/L 7   ALT (SGPT) U/L 10     Results from last 7 days   Lab  Units 09/13/24  0427 09/12/24  1633 09/08/24  1701   CALCIUM mg/dL 7.4* 8.1*  --    ALBUMIN g/dL  --  2.9*  --    MAGNESIUM mg/dL 2.0  --  1.7*   PHOSPHORUS mg/dL 11.2*  --   --        Results from last 7 days   Lab Units 09/13/24 0427   VITAMIN B 12 pg/mL <150*   FOLATE ng/mL 5.44     Hemoglobin A1C   Date/Time Value Ref Range Status   09/12/2024 1633 4.50 (L) 4.80 - 5.60 % Final     Glucose   Date/Time Value Ref Range Status   09/13/2024 1620 99 70 - 130 mg/dL Final   09/13/2024 1126 96 70 - 130 mg/dL Final   09/13/2024 0723 111 70 - 130 mg/dL Final       CT Lumbar Spine Without Contrast    Result Date: 9/12/2024   1. No acute fracture or subluxation of the lumbar spine. 2. Multilevel lumbar spondylosis, most prominent at L2-L3, as detailed above. 3. Bilateral renal parenchymal atrophy with multiple nonobstructing calcifications in both kidneys. 4. Moderate to severe calcified atherosclerotic disease.  This report was finalized on 9/12/2024 9:00 PM by Donald Henderson MD on Workstation: [id number]       I have personally reviewed all medications:  Scheduled Medications  cyanocobalamin, 1,000 mcg, Intramuscular, Daily  epoetin ted/ted-epbx, 10,000 Units, Intravenous, Once per day on Monday Wednesday Friday  folic acid, 1 mg, Oral, Daily  gabapentin, 100 mg, Oral, Nightly  insulin lispro, 2-7 Units, Subcutaneous, 4x Daily AC & at Bedtime  melatonin, 2.5 mg, Oral, Nightly  senna-docusate sodium, 2 tablet, Oral, BID  sevelamer, 2,400 mg, Oral, TID With Meals  sodium chloride, 3 mL, Intravenous, Q12H    Infusions   Diet  Diet: Regular/House, Renal; Low Potassium, Low Phosphorus; Fluid Consistency: Thin (IDDSI 0)    I have personally reviewed:  [x]  Laboratory   [x]  Microbiology   [x]  Radiology   [x]  EKG/Telemetry  [x]  Cardiology/Vascular   []  Pathology    []  Records       Assessment/Plan     Active Hospital Problems    Diagnosis  POA    **Frequent falls [R29.6]  Not Applicable    Bilateral leg numbness, acute  on chronic [R20.0]  Yes    General weakness [R53.1]  Yes    History of alcohol dependence, reports 5-year sobriety [F10.21]  Yes    B12 deficiency [E53.8]  Yes    Neuropathy [G62.9]  Yes    Macrocytic anemia [D53.9]  Yes    Metabolic acidosis [E87.20]  Yes    Hyperkalemia [E87.5]  Yes    Thrombocytopenia [D69.6]  Yes    ESRD (end stage renal disease) on dialysis [N18.6, Z99.2]  Not Applicable      Resolved Hospital Problems   No resolved problems to display.       58-year-old male with ESRD on dialysis presented to the hospital with frequent falls and acute on chronic bilateral leg numbness.    Seen by neurology and nephrology today.  B12 severely low likely contributing to his worsening neuropathy, weakness and falls.  He has been started on IM B12 replacement will continue this at discharge.  He also has a long beginning dialysis twice per week and his labs were severely deranged at admission.  Agree with nephrology he would likely benefit from dialysis 3 times per week.  Suspect that this would help his symptoms as well.  Getting dialysis this evening.  Reassess labs tomorrow.  If does not need further inpatient dialysis anticipate could discharge home 9/14.    Continue B12  Hyperkalemia resolved  Platelets trending up  CCP to order walker for him at home.      SCDs for DVT prophylaxis.  Full code.  Discussed with patient and nursing staff.  Anticipate discharge home in 1-2 days.  Expected Discharge Date: 9/14/2024; Expected Discharge Time:       Luis Fulton MD  [facility]  09/13/24  16:41 EDT

## 2024-09-13 NOTE — CONSULTS
[facility]                                                                                             Nephrology Initial Consult Note    Patient Identification:  Name: Viet Guerra Jr. MRN: [id number]  Age: 58 y.o. : 1965  Sex: male  Date:2024    Requesting Physician: As per consult order.  Reason for Consultation: ESRD management  Information from:patient/ family/ chart      History of Present Illness: This is a 58 y.o. year old male with ESRD on dialysis Monday and Friday, last dialysis was on Monday of this week.  He has residual urine output despite a creatinine of 16 and states that they normally do not take any fluid off on dialysis.  He denies shortness of breath, chest pain, fevers chills or edema.  Current access is a left arm AV fistula.  He presented to the ER yesterday with recurrent falls, leg numbness neuropathy and he had some mild hyperkalemia.  Potassium today is 4.7, CO2 10.    The following medical history and medications personally reviewed by me:    Problem List:     Frequent falls    Bilateral leg numbness, acute on chronic    General weakness    History of alcohol dependence, reports 5-year sobriety    ESRD (end stage renal disease) on dialysis    History of vitamin B12 deficiency    Neuropathy    Macrocytic anemia    Metabolic acidosis    Hyperkalemia    Thrombocytopenia      Past Medical History:  Past Medical History:   Diagnosis Date    Diabetes mellitus     ESRD (end stage renal disease) on dialysis     Renal disorder        Past Surgical History:  History reviewed. No pertinent surgical history.     Home Meds:   Medications Prior to Admission   Medication Sig Dispense Refill Last Dose    Antacid Extra Strength 750 MG chewable tablet    2024    gabapentin (NEURONTIN) 100 MG capsule    2024    potassium chloride 10 MEQ CR tablet Take 1 tablet by mouth.    9/12/2024    sodium bicarbonate 650 MG tablet Take 1 tablet by mouth.   9/12/2024    midodrine (PROAMATINE) 5 MG tablet Take 1 tablet by mouth 3 (Three) Times a Day.   Unknown       Current Meds:   Current Facility-Administered Medications   Medication Dose Route Frequency Provider Last Rate Last Admin    acetaminophen (TYLENOL) tablet 650 mg  650 mg Oral Q4H PRN Dimitrios Gunter MD   650 mg at 09/13/24 0123    Or    acetaminophen (TYLENOL) 160 MG/5ML oral solution 650 mg  650 mg Oral Q4H PRN Dimitrios Gunter MD        Or    acetaminophen (TYLENOL) suppository 650 mg  650 mg Rectal Q4H PRN Dimitrios Gunter MD        sennosides-docusate (PERICOLACE) 8.6-50 MG per tablet 2 tablet  2 tablet Oral BID Dimitrios Gunter MD   2 tablet at 09/13/24 0923    And    polyethylene glycol (MIRALAX) packet 17 g  17 g Oral Daily PRN Dimitrios Gunter MD        And    bisacodyl (DULCOLAX) EC tablet 5 mg  5 mg Oral Daily PRN Dimitrios Gunter MD        And    bisacodyl (DULCOLAX) suppository 10 mg  10 mg Rectal Daily PRN Dimitrios Gunter MD        Calcium Replacement - Follow Nurse / BPA Driven Protocol   Does not apply Dimitrios Aponte MD        cyanocobalamin injection 1,000 mcg  1,000 mcg Intramuscular Daily Dimitrios Gunter MD   1,000 mcg at 09/13/24 0923    dextrose (D50W) (25 g/50 mL) IV injection 25 g  25 g Intravenous Q15 Min PRN Dimitrios Gunter MD        dextrose (GLUTOSE) oral gel 15 g  15 g Oral Q15 Min PRN Dimitrios Gunter MD        famotidine (PEPCID) tablet 10 mg  10 mg Oral BID PRN Dimitrios Gunter MD        folic acid (FOLVITE) tablet 1 mg  1 mg Oral Daily Dimitrios Gunter MD   1 mg at 09/13/24 0923    folic acid 1 mg in sodium chloride 0.9 % 50 mL IVPB  1 mg Intravenous Once Dimitrios Gunter MD        gabapentin (NEURONTIN) capsule 100 mg  100 mg Oral Danaly Dimitrios Gunter MD   100 mg at  09/13/24 0123    glucagon (GLUCAGEN) injection 1 mg  1 mg Intramuscular Q15 Min PRN Dimitrios Gunter MD        HYDROcodone-acetaminophen (NORCO) 5-325 MG per tablet 1 tablet  1 tablet Oral Q6H PRN Dimitrios Gunter MD   1 tablet at 09/13/24 0923    insulin lispro (HUMALOG/ADMELOG) injection 2-7 Units  2-7 Units Subcutaneous 4x Daily AC & at Bedtime Dimitrios Gunter MD        loperamide (IMODIUM) capsule 2 mg  2 mg Oral 4x Daily PRN Dimitrios Gunter MD        Magnesium Standard Dose Replacement - Follow Nurse / BPA Driven Protocol   Does not apply PRN Dimitrios Gunter MD        melatonin tablet 2.5 mg  2.5 mg Oral Nightly Dimitrios Gunter MD        [Held by provider] midodrine (PROAMATINE) tablet 5 mg  5 mg Oral TID AC Dimitrios Gunter MD        ondansetron ODT (ZOFRAN-ODT) disintegrating tablet 4 mg  4 mg Oral Q6H PRN Dimitrios Gunter MD        Or    ondansetron (ZOFRAN) injection 4 mg  4 mg Intravenous Q6H PRN Dimitrios Gunter MD        Phosphorus Replacement - Follow Nurse / BPA Driven Protocol   Does not apply Dimitrios Aponte MD        Potassium Replacement - Follow Nurse / BPA Driven Protocol   Does not apply Dimitrios Aponte MD        sodium chloride 0.9 % flush 3 mL  3 mL Intravenous Q12H Dimitrios Gunter MD   3 mL at 09/13/24 0937    sodium chloride 0.9 % flush 3-10 mL  3-10 mL Intravenous PRN Dimitrios Gunter MD        sodium chloride 0.9 % infusion 40 mL  40 mL Intravenous PRN Dimitrios Gunter MD        sodium zirconium cyclosilicate (LOKELMA) packet 10 g  10 g Oral Once Dimitrios Gunter MD           Allergies:  Allergies   Allergen Reactions    Ibuprofen Other (See Comments)     Other reaction(s): Other (See Comments)   R/T ESRD     R/T ESRD       Social History:   Social History     Socioeconomic History    Marital status: Single   Tobacco Use    Smoking status: Former     Types:  "Cigarettes    Smokeless tobacco: Former   Vaping Use    Vaping status: Never Used    Passive vaping exposure: Yes   Substance and Sexual Activity    Alcohol use: Defer    Drug use: Not Currently    Sexual activity: Defer        Family History:  History reviewed. No pertinent family history.     Review of Systems: as per HPI, in addition:    General:      Complains of weakness / fatigue,                       No fevers / chills                       no weight loss  HEENT;       no dysphagia or visual changes  Neck:           normal range of motion, no swelling  Respiratory: no cough / congestion                      No shortness of air                       No wheezing  CV:              No chest pain                       No palpitations  Abdomen/GI: no nausea / vomiting                      No diarrhea / constipation                      No abdominal pain  :             no dysuria / urinary frequency                       No urgency, normal output  Endocrine:   no polyuria / polydipsia,                      No heat or cold intolerance  Skin:           + Rashes or skin lesions   Vascular:   No edema  Musculoskeletal: + Joint pain or deformities      Physical Exam:  Vitals:   Temp (24hrs), Av °F (36.7 °C), Min:97.7 °F (36.5 °C), Max:98.5 °F (36.9 °C)    /81 (BP Location: Right arm, Patient Position: Lying)   Pulse 72   Temp 97.7 °F (36.5 °C) (Oral)   Resp 18   Ht 180.3 cm (71\")   Wt 67.6 kg (149 lb)   SpO2 100%   BMI 20.78 kg/m²   Intake/Output:     Intake/Output Summary (Last 24 hours) at 2024 1027  Last data filed at 2024 0900  Gross per 24 hour   Intake 600 ml   Output --   Net 600 ml        Wt Readings from Last 1 Encounters:   24 2308 67.6 kg (149 lb)       Exam:    General Appearance:  Awake, alert, no acute distress  Chronically ill-appearing   Head and Face:  Normocephalic, atraumatic, mucous membranes moist, oropharynx clear   Eyes:  No icterus, pupils equal round and " reactive to light, extraocular movements intact    ENMT: Moist mucosa, tongue symmetric    Neck: Supple  no jugular venous distention  no thyromegaly   Pulmonary:  Respiratory effort: Normal  Auscultation of lungs: Clear bilaterally  No wheezes  No rhonchi  Good air movement, good expansion   Chest wall:  No tenderness or deformity   Cardiovascular:  Auscultation of the heart: Normal rhythm, no murmurs  No edema of bilateral lower extremities   Abdomen:  Abdomen: soft, nontender, normal bowel sounds all four quadrants, no masses   Liver and spleen: no hepatosplenomegaly   Musculoskeletal: Digits and nails: normal  Normal range of motion  No joint swelling or gross deformities    Skin: Skin inspection: color normal, no visible rashes or lesions  Skin palpation: texture, turgor normal, no palpable lesions   Lymphatic:  no cervical lymphadenopathy    Psychiatric: Judgement and insight: normal  Orientation to person place and time: normal  Mood and affect: normal       DATA:  Radiology and Labs:  The following labs independently reviewed by me, additional AM labs ordered  Old records independently reviewed showing ESRD on dialysis  The following radiologic studies independently viewed by me, findings CT lumbar spine no acute fracture  Interval notes, chart personally reviewed by me.  I have reviewed and summarized old records as detailed above  Plan of care discussed with patient himself at bedside  New problems include hyperkalemia, anemia, acidosis    Dialysis patient access: Left arm AV fistula    Risk/ complexity of medical care/ medical decision making: High complexity, dialysis management  Chronic illness with severe exacerbation or progression: ESRD with acidosis and hyperkalemia      Labs:   Recent Results (from the past 24 hour(s))   Comprehensive Metabolic Panel    Collection Time: 09/12/24  4:33 PM    Specimen: Arm, Right; Blood   Result Value Ref Range    Glucose 82 65 - 99 mg/dL    BUN 34 (H) 6 - 20 mg/dL     Creatinine 16.88 (H) 0.76 - 1.27 mg/dL    Sodium 145 136 - 145 mmol/L    Potassium 5.5 (H) 3.5 - 5.2 mmol/L    Chloride 115 (H) 98 - 107 mmol/L    CO2 12.7 (L) 22.0 - 29.0 mmol/L    Calcium 8.1 (L) 8.6 - 10.5 mg/dL    Total Protein 5.4 (L) 6.0 - 8.5 g/dL    Albumin 2.9 (L) 3.5 - 5.2 g/dL    ALT (SGPT) 10 1 - 41 U/L    AST (SGOT) 7 1 - 40 U/L    Alkaline Phosphatase 142 (H) 39 - 117 U/L    Total Bilirubin 0.2 0.0 - 1.2 mg/dL    Globulin 2.5 gm/dL    A/G Ratio 1.2 g/dL    BUN/Creatinine Ratio 2.0 (L) 7.0 - 25.0    Anion Gap 17.3 (H) 5.0 - 15.0 mmol/L    eGFR 3.0 (L) >60.0 mL/min/1.73   CK    Collection Time: 09/12/24  4:33 PM    Specimen: Arm, Right; Blood   Result Value Ref Range    Creatine Kinase 113 20 - 200 U/L   CBC Auto Differential    Collection Time: 09/12/24  4:33 PM    Specimen: Arm, Right; Blood   Result Value Ref Range    WBC 4.95 3.40 - 10.80 10*3/mm3    RBC 3.28 (L) 4.14 - 5.80 10*6/mm3    Hemoglobin 10.4 (L) 13.0 - 17.7 g/dL    Hematocrit 32.6 (L) 37.5 - 51.0 %    MCV 99.4 (H) 79.0 - 97.0 fL    MCH 31.7 26.6 - 33.0 pg    MCHC 31.9 31.5 - 35.7 g/dL    RDW 18.7 (H) 12.3 - 15.4 %    RDW-SD 67.2 (H) 37.0 - 54.0 fl    MPV 9.2 6.0 - 12.0 fL    Platelets 72 (L) 140 - 450 10*3/mm3    Neutrophil % 69.9 42.7 - 76.0 %    Lymphocyte % 19.8 19.6 - 45.3 %    Monocyte % 5.7 5.0 - 12.0 %    Eosinophil % 3.6 0.3 - 6.2 %    Basophil % 0.8 0.0 - 1.5 %    Immature Grans % 0.2 0.0 - 0.5 %    Neutrophils, Absolute 3.46 1.70 - 7.00 10*3/mm3    Lymphocytes, Absolute 0.98 0.70 - 3.10 10*3/mm3    Monocytes, Absolute 0.28 0.10 - 0.90 10*3/mm3    Eosinophils, Absolute 0.18 0.00 - 0.40 10*3/mm3    Basophils, Absolute 0.04 0.00 - 0.20 10*3/mm3    Immature Grans, Absolute 0.01 0.00 - 0.05 10*3/mm3    nRBC 0.0 0.0 - 0.2 /100 WBC   Hemoglobin A1c    Collection Time: 09/12/24  4:33 PM    Specimen: Arm, Right; Blood   Result Value Ref Range    Hemoglobin A1C 4.50 (L) 4.80 - 5.60 %   Basic Metabolic Panel    Collection Time: 09/13/24   4:27 AM    Specimen: Blood   Result Value Ref Range    Glucose 96 65 - 99 mg/dL    BUN 39 (H) 6 - 20 mg/dL    Creatinine 16.73 (H) 0.76 - 1.27 mg/dL    Sodium 142 136 - 145 mmol/L    Potassium 4.7 3.5 - 5.2 mmol/L    Chloride 110 (H) 98 - 107 mmol/L    CO2 10.0 (L) 22.0 - 29.0 mmol/L    Calcium 7.4 (L) 8.6 - 10.5 mg/dL    BUN/Creatinine Ratio 2.3 (L) 7.0 - 25.0    Anion Gap 22.0 (H) 5.0 - 15.0 mmol/L    eGFR 3.0 (L) >60.0 mL/min/1.73   CBC (No Diff)    Collection Time: 09/13/24  4:27 AM    Specimen: Blood   Result Value Ref Range    WBC 5.07 3.40 - 10.80 10*3/mm3    RBC 2.80 (L) 4.14 - 5.80 10*6/mm3    Hemoglobin 8.7 (L) 13.0 - 17.7 g/dL    Hematocrit 28.6 (L) 37.5 - 51.0 %    .1 (H) 79.0 - 97.0 fL    MCH 31.1 26.6 - 33.0 pg    MCHC 30.4 (L) 31.5 - 35.7 g/dL    RDW 18.7 (H) 12.3 - 15.4 %    RDW-SD 69.7 (H) 37.0 - 54.0 fl    MPV 10.0 6.0 - 12.0 fL    Platelets 81 (L) 140 - 450 10*3/mm3   Vitamin B12    Collection Time: 09/13/24  4:27 AM    Specimen: Blood   Result Value Ref Range    Vitamin B-12 <150 (L) 211 - 946 pg/mL   Folate    Collection Time: 09/13/24  4:27 AM    Specimen: Blood   Result Value Ref Range    Folate 5.44 4.78 - 24.20 ng/mL   Magnesium    Collection Time: 09/13/24  4:27 AM    Specimen: Blood   Result Value Ref Range    Magnesium 2.0 1.6 - 2.6 mg/dL   Phosphorus    Collection Time: 09/13/24  4:27 AM    Specimen: Blood   Result Value Ref Range    Phosphorus 11.2 (H) 2.5 - 4.5 mg/dL   POC Glucose Once    Collection Time: 09/13/24  7:23 AM    Specimen: Blood   Result Value Ref Range    Glucose 111 70 - 130 mg/dL       Radiology:  Imaging Results (Last 24 Hours)       Procedure Component Value Units Date/Time    CT Lumbar Spine Without Contrast [[id number]] Collected: 09/12/24 2054     Updated: 09/12/24 2103    Narrative:      CT LUMBAR SPINE WO CONTRAST-     DATE OF EXAM: 09/12/2024 7:21 PM     INDICATION: low back pain radiating to legs. Bilateral lower extremity  pain for 3 days. Increased pain  with tingling with walking.     COMPARISON: None available.     TECHNIQUE: Multiple contiguous axial images were acquired through the  lumbar spine without the intravenous administration of contrast.  Reformatted coronal and sagittal sequences were also reviewed. Radiation  dose reduction techniques were utilized, including automated exposure  control and exposure modulation based on body size.     FINDINGS:  Normal variant L3 limbus vertebra. Multilevel endplate irregularities,  consistent with Schmorl's nodes. The lumbar vertebral bodies otherwise  demonstrate well preserved height and alignment. No evidence of acute  fracture of the lumbar spine. No concerning osseous lesion.     L1-L2: Mild bilateral facet and ligamentous hypertrophy, without  significant spinal canal stenosis or neural foraminal narrowing.  L2-L3: Mild diffuse disc bulge with bilateral facet hypertrophy.  Findings result in mild indentation of the thecal sac and moderate to  severe bilateral neural foraminal narrowing.  L3-L4: Diffuse disc bulge with bilateral facet hypertrophy. Findings  result in mild indentation of the thecal sac and mild bilateral neural  foraminal narrowing.  L4-L5: Diffuse disc bulge with bilateral facet hypertrophy. Findings  result in mild indentation of the thecal sac and mild bilateral neural  renal narrowing.  L5-S1: Diffuse disc bulge with bilateral facet hypertrophy. Findings  result in mild bilateral neural foraminal narrowing. No significant  spinal canal stenosis.     The paraspinal soft tissues are unremarkable. Bilateral renal  parenchymal atrophy with multiple nonobstructing calcifications in both  kidneys. Postoperative changes from cholecystectomy. Moderate to severe  calcified atherosclerotic disease in the abdominal aorta and its  branches without evidence of aneurysm.       Impression:         1. No acute fracture or subluxation of the lumbar spine.  2. Multilevel lumbar spondylosis, most prominent at  L2-L3, as detailed  above.  3. Bilateral renal parenchymal atrophy with multiple nonobstructing  calcifications in both kidneys.  4. Moderate to severe calcified atherosclerotic disease.     This report was finalized on 9/12/2024 9:00 PM by Donald Henderson MD on  Workstation: [id number]                    ASSESSMENT:   ESRD.  Normally on dialysis 2 days a week but his baseline GFR is only 3 he has severe acidosis and had hyperkalemia yesterday.  He would likely benefit from standard, 3 dialysis treatments per week  Severe acidosis with high gap  Hyperkalemia  Anemia of CKD  Severe hypophosphatemia    Frequent falls    Bilateral leg numbness, acute on chronic    General weakness    History of alcohol dependence, reports 5-year sobriety    ESRD (end stage renal disease) on dialysis    History of vitamin B12 deficiency    Neuropathy    Macrocytic anemia    Metabolic acidosis    Hyperkalemia    Thrombocytopenia        DISCUSSION/PLAN:   Will plan dialysis today for 4 hours  Likely would benefit from 3 days a week dialysis based on his presenting electrolyte panel  Will place on low potassium, renal, low phosphorus diet  Add phosphorus binders with meals  Epogen with HD for anemia  Euvolemic on exam, UF interval gain  Will follow for dialysis needs    Continue to monitor electrolytes and volume closely    I appreciate the consult request.  Please send me a secure chat message with any nonurgent questions regarding patient care.  For any urgent patient care issues please call my office number below.      Ki Gallardo MD  [facility]  Office phone number: [phone]  Answering service phone number: [phone]      9/13/2024        Dictation via Dragon dictation software

## 2024-09-13 NOTE — THERAPY EVALUATION
Patient Name: Viet Guerra Jr.  : 1965    MRN: [id number]                              Today's Date: 2024       Admit Date: 2024    Visit Dx:     ICD-10-CM ICD-9-CM   1. Frequent falls  R29.6 V15.88   2. Acute midline low back pain with bilateral sciatica  M54.42 724.2    M54.41 724.3   3. Neuropathy involving both lower extremities  G57.93 356.9   4. ESRD on hemodialysis  N18.6 585.6    Z99.2 V45.11     Patient Active Problem List   Diagnosis    Frequent falls    Bilateral leg numbness, acute on chronic    General weakness    History of alcohol dependence, reports 5-year sobriety    ESRD (end stage renal disease) on dialysis    History of vitamin B12 deficiency    Neuropathy    Macrocytic anemia    Metabolic acidosis    Hyperkalemia    Thrombocytopenia     Past Medical History:   Diagnosis Date    Diabetes mellitus     ESRD (end stage renal disease) on dialysis     Renal disorder      History reviewed. No pertinent surgical history.   General Information       Row Name 24 1215          Physical Therapy Time and Intention    Document Type evaluation  -CB     Mode of Treatment individual therapy;physical therapy  -CB       Row Name 24 1215          General Information    Patient Profile Reviewed yes  -CB     Prior Level of Function gait;transfer;bed mobility;independent:  6-7 falls per week; falls increased over the last month  -CB     Existing Precautions/Restrictions fall  -CB     Barriers to Rehab none identified  -CB       Row Name 24 1215          Living Environment    People in Home sibling(s)  -CB       Row Name 24 1215          Home Main Entrance    Number of Stairs, Main Entrance six  -CB     Stair Railings, Main Entrance railing on right side (ascending)  -CB       Row Name 24 1215          Cognition    Orientation Status (Cognition) oriented x 3  -CB       Row Name 24 1215          Safety Issues, Functional Mobility    Impairments Affecting  Function (Mobility) balance;endurance/activity tolerance;strength;pain;motor control  -CB               User Key  (r) = Recorded By, (t) = Taken By, (c) = Cosigned By      Initials Name Provider Type    Marya Contreras PT Physical Therapist                   Mobility       Row Name 09/13/24 1217          Bed Mobility    Bed Mobility supine-sit  -CB     Supine-Sit [place] (Bed Mobility) standby assist  -CB     Assistive Device (Bed Mobility) head of bed elevated  -CB       Row Name 09/13/24 1217          Sit-Stand Transfer    Sit-Stand [place] (Transfers) contact guard;verbal cues  -CB     Assistive Device (Sit-Stand Transfers) walker, front-wheeled  -CB       Row Name 09/13/24 1217          Gait/Stairs (Locomotion)    [place] Level (Gait) minimum assist (75% patient effort);verbal cues  education regarding use of AD  -CB     Assistive Device (Gait) walker, front-wheeled  -CB     Distance in Feet (Gait) 40  -CB     Deviations/Abnormal Patterns (Gait) gait speed decreased;stride length decreased  -CB     Bilateral Gait Deviations knee buckling bilaterally  -CB     Comment, (Gait/Stairs) B knee flexion and buckling during ambulation  -CB               User Key  (r) = Recorded By, (t) = Taken By, (c) = Cosigned By      Initials Name Provider Type    Marya Contreras PT Physical Therapist                   Obj/Interventions       Row Name 09/13/24 1218          Range of Motion Comprehensive    General Range of Motion bilateral lower extremity ROM WFL  -CB       Row Name 09/13/24 1218          Strength Comprehensive (MMT)    Comment, General Manual Muscle Testing (MMT) Assessment BLE 3+/5  -CB       Row Name 09/13/24 1218          Balance    Balance Assessment standing static balance;standing dynamic balance  -CB     Static Standing Balance contact guard  -CB     Dynamic Standing Balance minimal assist  -CB     Position/Device Used, Standing Balance supported;walker, front-wheeled  -CB     Balance  "Interventions standing;sit to stand;supported;static;dynamic;minimal challenge  -CB       Row Name 09/13/24 1218          Sensory Assessment (Somatosensory)    Sensory Assessment (Somatosensory) --  BLE numbess  -CB               User Key  (r) = Recorded By, (t) = Taken By, (c) = Cosigned By      Initials Name Provider Type    CB Marya Vides, PT Physical Therapist                   Goals/Plan    No documentation.                  Clinical Impression       Row Name 09/13/24 1221          Pain    Pretreatment Pain Rating 5/10  -CB     Pre/Posttreatment Pain Comment during hip flexion  -CB     Pain Intervention(s) Repositioned;Rest;Ambulation/increased activity  -CB       Row Name 09/13/24 1221          Plan of Care Review    Plan of Care Reviewed With patient  -CB     Outcome Evaluation Patient is a 58 y.o. male admitted to [facility] for frequent falls and low back pain with bilateral sciatica on 9/12/2024. PMHx includes ESRD. Patient is ind at baseline without use of AD and lives with his sister. He has a total of 6 ANITA with R handrail. He reports his falls have increased over the last month and he falls about 6-7x.wk. He reports his legs \"give out.\" Pt reports BLE numbness and BLE MMT 3+/5. Today, patient performed bed mobility with SBA, required CGA for transfers, and ambulated 40ft using rwx requiring Calixto. Difficulty with full knee extension during ambulation and knee buckling noted. VC to push through BUE on rwx. Patient may benefit from skilled PT services to address functional deficits and improve level of independence prior to discharge. Anticipate rehab upon DC.  -CB       Row Name 09/13/24 1221          Therapy Assessment/Plan (PT)    Rehab Potential (PT) good, to achieve stated therapy goals  -CB     Criteria for Skilled Interventions Met (PT) yes  -CB     Therapy Frequency (PT) 6 times/wk  -CB       Row Name 09/13/24 1221          Positioning and Restraints    Pre-Treatment Position in bed  -CB     Post " "Treatment Position chair  -CB     In Chair notified nsg;reclined;call light within reach;encouraged to call for assist;exit alarm on  -CB               User Key  (r) = Recorded By, (t) = Taken By, (c) = Cosigned By      Initials Name Provider Type    Marya Contreras, PT Physical Therapist                   Outcome Measures       Row Name 09/13/24 0859          How much help from another person do you currently need...    Turning from your back to your side while in flat bed without using bedrails? 4  -RK     Moving from lying on back to sitting on the side of a flat bed without bedrails? 4  -RK     Moving to and from a bed to a chair (including a wheelchair)? 4  -RK     Standing up from a chair using your arms (e.g., wheelchair, bedside chair)? 4  -RK     Climbing 3-5 steps with a railing? 4  -RK     To walk in hospital room? 4  -RK     AM-PAC 6 Clicks Score (PT) 24  -RK     Highest Level of Mobility Goal 8 --> Walked 250 feet or more  -RK               User Key  (r) = Recorded By, (t) = Taken By, (c) = Cosigned By      Initials Name Provider Type    Cinda Diego, RN Registered Nurse                                   PT Recommendation and Plan     Plan of Care Reviewed With: patient  Outcome Evaluation: Patient is a 58 y.o. male admitted to [facility] for frequent falls and low back pain with bilateral sciatica on 9/12/2024. PMHx includes ESRD. Patient is ind at baseline without use of AD and lives with his sister. He has a total of 6 ANITA with R handrail. He reports his falls have increased over the last month and he falls about 6-7x.wk. He reports his legs \"give out.\" Pt reports BLE numbness and BLE MMT 3+/5. Today, patient performed bed mobility with SBA, required CGA for transfers, and ambulated 40ft using rwx requiring Calixto. Difficulty with full knee extension during ambulation and knee buckling noted. VC to push through BUE on rwx. Patient may benefit from skilled PT services to address functional deficits " and improve level of independence prior to discharge. Anticipate rehab upon DC.     Time Calculation:         PT Charges       Row Name 09/13/24 1346             Time Calculation    Start Time 0946  -CB      Stop Time 1000  -CB      Time Calculation (min) 14 min  -CB      PT Received On 09/13/24  -CB      PT - Next Appointment 09/14/24  -CB      PT Goal Re-Cert Due Date 09/20/24  -CB         Time Calculation- PT    Total Timed Code Minutes- PT 8 minute(s)  -CB         Timed Charges    25554 - PT Therapeutic Activity Minutes 8  -CB         Total Minutes    Timed Charges Total Minutes 8  -CB       Total Minutes 8  -CB                User Key  (r) = Recorded By, (t) = Taken By, (c) = Cosigned By      Initials Name Provider Type    CB Marya Vides, PT Physical Therapist                  Therapy Charges for Today       Code Description Service Date Service Provider Modifiers Qty    77195877551 HC PT THERAPEUTIC ACT EA 15 MIN 9/13/2024 Marya Vides, PT GP 1    14426343702 HC PT EVAL MOD COMPLEXITY 3 9/13/2024 Marya Vides, PT GP 1            PT G-Codes  AM-PAC 6 Clicks Score (PT): 24  PT Discharge Summary  Anticipated Discharge Disposition (PT): skilled nursing facility, inpatient rehabilitation facility    Marya Vides PT  9/13/2024

## 2024-09-13 NOTE — PROGRESS NOTES
According to chart review, this patient sees Dr. Tila Kaiser as his nephrologist. Will defer this consult to their service for continuity of care.

## 2024-09-13 NOTE — PROGRESS NOTES
Discharge Planning Assessment  [facility]     Patient Name: Viet Guerra Jr.  MRN: [id number]  Today's Date: 9/13/2024    Admit Date: 9/12/2024    Plan: Plans to return home with family   Discharge Needs Assessment       Row Name 09/13/24 1403       Living Environment    People in Home sibling(s)    Name(s) of People in Home Sister Hermelinda Guerra [phone]    Current Living Arrangements home    Potentially Unsafe Housing Conditions none    In the past 12 months has the electric, gas, oil, or water company threatened to shut off services in your home? No    Primary Care Provided by self    Provides Primary Care For no one    Family Caregiver if Needed none    Quality of Family Relationships other (see comments)  Patient states sister pays for bills and charges patient rent but does not help with care    Able to Return to Prior Arrangements yes  Patient is attempting to get section 8 housing so he can have his own place    Living Arrangement Comments Patient expresses interest in finding his own place eventually       Resource/Environmental Concerns    Resource/Environmental Concerns financial;home accessibility;reliable transportation    Financial Concerns none    Home Accessibility Concerns stairs to enter home       Transportation Needs    In the past 12 months, has lack of transportation kept you from medical appointments or from getting medications? no    In the past 12 months, has lack of transportation kept you from meetings, work, or from getting things needed for daily living? No       Food Insecurity    Within the past 12 months, you worried that your food would run out before you got the money to buy more. Never true    Within the past 12 months, the food you bought just didn't last and you didn't have money to get more. Never true       Transition Planning    Patient/Family Anticipates Transition to home    Patient/Family Anticipated Services at Transition none    Transportation Anticipated  "public transportation       Discharge Needs Assessment    Readmission Within the Last 30 Days no previous admission in last 30 days    Equipment Currently Used at Home none                   Discharge Plan       Row Name 09/13/24 1418       Plan    Plan Plans to return home with family    Patient/Family in Agreement with Plan yes    Plan Comments Spoke with patient, verified correct information on facesheet and explain the role of CCP. Patient states he lives in a single story home with his sister with 6 steps to enter. Patient states he did not use any DME prior to this admission and does not own any. PCP Zahida Sanchez confirmed and patient prefers [facility] on [address]/[facility]. Patient [facility] Patient states he has never had home health in the past nor has he been to SNF. Patient states he has a \"cat\" that he needs to get home to and therefore he can not d/c to SNF. Plan at this time will be pending medical mgmt plan but patient states he plans to return home with sister but is actively trying to get his own place/section 8 housing due to not wanting to live with sister anymore. Confirmed patient does feel safe at home. CCP to follow for d/c needs.                  Continued Care and Services - Admitted Since 9/12/2024    No active coordination exists for this encounter.          Demographic Summary    No documentation.                  Functional Status    No documentation.                  Psychosocial    No documentation.                  Abuse/Neglect    No documentation.                  Legal    No documentation.                  Substance Abuse    No documentation.                  Patient Forms    No documentation.                     Annette Dobbs RN    "

## 2024-09-13 NOTE — PLAN OF CARE
"Goal Outcome Evaluation:  Plan of Care Reviewed With: patient              Patient is a 58 y.o. male admitted to [facility] for frequent falls and low back pain with bilateral sciatica on 9/12/2024. PMHx includes ESRD. Patient is ind at baseline without use of AD and lives with his sister. He has a total of 6 ANITA with R handrail. He reports his falls have increased over the last month and he falls about 6-7x.wk. He reports his legs \"give out.\" Pt reports BLE numbness and BLE MMT 3+/5. Today, patient performed bed mobility with SBA, required CGA for transfers, and ambulated 40ft using rwx requiring Calixto. Difficulty with full knee extension during ambulation and knee buckling noted. VC to push through BUE on rwx. Patient may benefit from skilled PT services to address functional deficits and improve level of independence prior to discharge. Anticipate rehab upon DC.      Anticipated Discharge Disposition (PT): skilled nursing facility, inpatient rehabilitation facility                        "

## 2024-09-13 NOTE — ED NOTES
Nursing report ED to floor  Viet Guerra Jr.  58 y.o.  male    HPI :  HPI (Adult)  Stated Reason for Visit: foot pain    Chief Complaint  Chief Complaint   Patient presents with    Foot Pain       Admitting doctor:   Dimitrios Gunter MD    Admitting diagnosis:   The primary encounter diagnosis was Frequent falls. Diagnoses of Acute midline low back pain with bilateral sciatica, Neuropathy involving both lower extremities, and ESRD on hemodialysis were also pertinent to this visit.    Code status:   Current Code Status       Date Active Code Status Order ID Comments User Context       9/12/2024 2159 CPR (Attempt to Resuscitate) [id number]  Dimitrios Gunter MD ED        Question Answer    Code Status (Patient has no pulse and is not breathing) CPR (Attempt to Resuscitate)    Medical Interventions (Patient has pulse or is breathing) Full Support    Level Of Support Discussed With Patient                    Allergies:   Ibuprofen    Isolation:   No active isolations    Intake and Output  No intake or output data in the 24 hours ending 09/12/24 2204    Weight:   There were no vitals filed for this visit.    Most recent vitals:   Vitals:    09/12/24 1415 09/12/24 1510 09/12/24 2030   BP: 170/90 152/89 148/83   BP Location:  Right arm    Patient Position:  Sitting    Pulse: 77 69 71   Resp: 16 16 16   Temp: 98.5 °F (36.9 °C)     SpO2: 100% 100% 99%       Active LDAs/IV Access:   Lines, Drains & Airways       Active LDAs       None                    Labs (abnormal labs have a star):   Labs Reviewed   COMPREHENSIVE METABOLIC PANEL - Abnormal; Notable for the following components:       Result Value    BUN 34 (*)     Creatinine 16.88 (*)     Potassium 5.5 (*)     Chloride 115 (*)     CO2 12.7 (*)     Calcium 8.1 (*)     Total Protein 5.4 (*)     Albumin 2.9 (*)     Alkaline Phosphatase 142 (*)     BUN/Creatinine Ratio 2.0 (*)     Anion Gap 17.3 (*)     eGFR 3.0 (*)     All other components within normal  limits    Narrative:     GFR Normal >60  Chronic Kidney Disease <60  Kidney Failure <15     CBC WITH AUTO DIFFERENTIAL - Abnormal; Notable for the following components:    RBC 3.28 (*)     Hemoglobin 10.4 (*)     Hematocrit 32.6 (*)     MCV 99.4 (*)     RDW 18.7 (*)     RDW-SD 67.2 (*)     Platelets 72 (*)     All other components within normal limits   CK - Normal   BASIC METABOLIC PANEL   CBC (NO DIFF)   VITAMIN B12   FOLATE   MAGNESIUM   PHOSPHORUS   CBC AND DIFFERENTIAL    Narrative:     The following orders were created for panel order CBC & Differential.  Procedure                               Abnormality         Status                     ---------                               -----------         ------                     CBC Auto Differential[[id number]]        Abnormal            Final result                 Please view results for these tests on the individual orders.       EKG:   No orders to display       Meds given in ED:   Medications   sodium chloride 0.9 % flush 3 mL (has no administration in time range)   sodium chloride 0.9 % flush 3-10 mL (has no administration in time range)   sodium chloride 0.9 % infusion 40 mL (has no administration in time range)   famotidine (PEPCID) tablet 10 mg (has no administration in time range)   ondansetron ODT (ZOFRAN-ODT) disintegrating tablet 4 mg (has no administration in time range)     Or   ondansetron (ZOFRAN) injection 4 mg (has no administration in time range)   melatonin tablet 2.5 mg (has no administration in time range)   Enoxaparin Sodium (LOVENOX) syringe 30 mg (has no administration in time range)   Potassium Replacement - Follow Nurse / BPA Driven Protocol (has no administration in time range)   Magnesium Standard Dose Replacement - Follow Nurse / BPA Driven Protocol (has no administration in time range)   Phosphorus Replacement - Follow Nurse / BPA Driven Protocol (has no administration in time range)   Calcium Replacement - Follow Nurse / BPA  Driven Protocol (has no administration in time range)   acetaminophen (TYLENOL) tablet 650 mg (has no administration in time range)     Or   acetaminophen (TYLENOL) 160 MG/5ML oral solution 650 mg (has no administration in time range)     Or   acetaminophen (TYLENOL) suppository 650 mg (has no administration in time range)   HYDROcodone-acetaminophen (NORCO) 5-325 MG per tablet 1 tablet (has no administration in time range)   sennosides-docusate (PERICOLACE) 8.6-50 MG per tablet 2 tablet (has no administration in time range)     And   polyethylene glycol (MIRALAX) packet 17 g (has no administration in time range)     And   bisacodyl (DULCOLAX) EC tablet 5 mg (has no administration in time range)     And   bisacodyl (DULCOLAX) suppository 10 mg (has no administration in time range)   loperamide (IMODIUM) capsule 2 mg (has no administration in time range)   oxyCODONE-acetaminophen (PERCOCET) 5-325 MG per tablet 1 tablet (1 tablet Oral Given 9/12/24 1900)       Imaging results:  CT Lumbar Spine Without Contrast    Result Date: 9/12/2024   1. No acute fracture or subluxation of the lumbar spine. 2. Multilevel lumbar spondylosis, most prominent at L2-L3, as detailed above. 3. Bilateral renal parenchymal atrophy with multiple nonobstructing calcifications in both kidneys. 4. Moderate to severe calcified atherosclerotic disease.  This report was finalized on 9/12/2024 9:00 PM by Donald Henderson MD on Workstation: [id number]       Ambulatory status:   Up w/ assistance     Social issues:   Social History     Socioeconomic History    Marital status: Single       Peripheral Neurovascular  Peripheral Neurovascular (Adult)  Peripheral Neurovascular WDL: .WDL except, neurovascular assessment lower  LLE Neurovascular Assessment  Sensation LLE: numbness present, tingling present  RLE Neurovascular Assessment  Sensation RLE: numbness present, tingling present    Neuro Cognitive  Neuro Cognitive (Adult)  Cognitive/Neuro/Behavioral  WDL: WDL    Learning  Learning Assessment (Adult)  Learning Readiness and Ability: physical limitation noted    Respiratory  Respiratory WDL  Respiratory WDL: WDL    Abdominal Pain       Pain Assessments  Pain (Adult)  (0-10) Pain Rating: Rest: 0 (pt is sleeping at this time, absence of pain is inferred)  (0-10) Pain Rating: Activity: 0 (pt is sleeping at this time, absence of pain is inferred)  Response to Pain Interventions: nonverbal indicators absent/decreased    NIH Stroke Scale       Radha Quinones RN  09/12/24 22:04 EDT

## 2024-09-13 NOTE — PLAN OF CARE
Goal Outcome Evaluation:      New admit from ED. Pt A&Ox4. LUE fistula, HD MWF. Up ad jennifer. Awaiting MRI.

## 2024-09-13 NOTE — PLAN OF CARE
Goal Outcome Evaluation:  Plan of Care Reviewed With: patient        Progress: no change  Outcome Evaluation: VSS. neuropathy. dialysis today. AO. x1 assist. R AV fistula. pain managed with PO meds. plan to d/c to possibly rehab.

## 2024-09-13 NOTE — SIGNIFICANT NOTE
09/13/24 1546   OTHER   Discipline occupational therapist   Rehab Time/Intention   Session Not Performed other (see comments)  (The pt is receiving dialysis. OT to f/u tomorrow.)   Recommendation   OT - Next Appointment 09/14/24

## 2024-09-13 NOTE — CONSULTS
Neurology Consult Note  Consult Date: 9/13/2024  Referring MD: No ref. provider found  Reason for Consult I have been asked to see the patient in neurological consultation to render advice and opinion regarding weakness.     Viet Guerra Jr. is a 58 y.o. male with past medical history of diabetes, ESRD on HD, prior alcohol dependence reports sobriety for at least 5 years who presented to the hospital on 9/12/2024 with complaints of pain of both feet radiating up both legs with associated weakness of bilateral lower extremities.  He states about 10 days ago he noticed that he was having a painful sensation of both feet described as a tingling and sharp shooting pain.  When this did not improve he presented to an outside hospital.  He states he had Dopplers of both of his feet and legs and were told that this was normal and was sent home.  He then started to have the pain spreading up his legs and noticed that he was becoming more weak therefore he presented to [facility].  He states he was again discharged.  After discharge he was suffering recurrent falls due to the pain.  He denies any involvement of his upper extremities.  Has some minimal lower back pain because of the fall. No dysphagia, visual or speech disturbance.  He has not had any change with his urinary function or bowel urgency.  He presented with a creatinine of 16.88.  His CK is normal.  His B12 level was <150.  He has had a CT of his L-spine that revealed multilevel lumbar spondylosis most prominent at L2-L3.    On exam he has absent vibratory and cold temperature sensation from just above the knee down to both feet.  Diminished cold temperature sensation of his left upper extremity.  He has decreased strength of both lower extremities worse on the left more distal than proximal.     Past Medical/Surgical Hx:  Past Medical History:   Diagnosis Date    Diabetes mellitus     ESRD (end stage renal disease) on dialysis     Renal disorder   "    History reviewed. No pertinent surgical history.  Medications On Admission  Medications Prior to Admission   Medication Sig Dispense Refill Last Dose    Antacid Extra Strength 750 MG chewable tablet    9/12/2024    gabapentin (NEURONTIN) 100 MG capsule    9/12/2024    potassium chloride 10 MEQ CR tablet Take 1 tablet by mouth.   9/12/2024    sodium bicarbonate 650 MG tablet Take 1 tablet by mouth.   9/12/2024    midodrine (PROAMATINE) 5 MG tablet Take 1 tablet by mouth 3 (Three) Times a Day.   Unknown     Allergies:  Allergies   Allergen Reactions    Ibuprofen Other (See Comments)     Other reaction(s): Other (See Comments)   R/T ESRD     R/T ESRD     Social Hx:  Social History     Socioeconomic History    Marital status: Single   Tobacco Use    Smoking status: Former     Types: Cigarettes    Smokeless tobacco: Former   Vaping Use    Vaping status: Never Used    Passive vaping exposure: Yes   Substance and Sexual Activity    Alcohol use: Defer    Drug use: Not Currently    Sexual activity: Defer     Family Hx:  History reviewed. No pertinent family history.  Review of Systems   Neurological:  Positive for weakness.     Exam  /81 (BP Location: Right arm, Patient Position: Lying)   Pulse 72   Temp 97.7 °F (36.5 °C) (Oral)   Resp 18   Ht 180.3 cm (71\")   Wt 67.6 kg (149 lb)   SpO2 100%   BMI 20.78 kg/m²     Current Facility-Administered Medications:     acetaminophen (TYLENOL) tablet 650 mg, 650 mg, Oral, Q4H PRN, 650 mg at 09/13/24 0123 **OR** acetaminophen (TYLENOL) 160 MG/5ML oral solution 650 mg, 650 mg, Oral, Q4H PRN **OR** acetaminophen (TYLENOL) suppository 650 mg, 650 mg, Rectal, Q4H PRN, Dimitrios Gunter MD    sennosides-docusate (PERICOLACE) 8.6-50 MG per tablet 2 tablet, 2 tablet, Oral, BID, 2 tablet at 09/13/24 0923 **AND** polyethylene glycol (MIRALAX) packet 17 g, 17 g, Oral, Daily PRN **AND** bisacodyl (DULCOLAX) EC tablet 5 mg, 5 mg, Oral, Daily PRN **AND** bisacodyl (DULCOLAX) " suppository 10 mg, 10 mg, Rectal, Daily PRN, Dimitrios Gunter MD    Calcium Replacement - Follow Nurse / BPA Driven Protocol, , Does not apply, PRN, Dimitrios Gunter MD    cyanocobalamin injection 1,000 mcg, 1,000 mcg, Intramuscular, Daily, Dimitrios Gunter MD, 1,000 mcg at 09/13/24 0923    dextrose (D50W) (25 g/50 mL) IV injection 25 g, 25 g, Intravenous, Q15 Min PRN, Dimitrios Gunter MD    dextrose (GLUTOSE) oral gel 15 g, 15 g, Oral, Q15 Min PRN, Dimitrios Gunter MD    famotidine (PEPCID) tablet 10 mg, 10 mg, Oral, BID PRN, Dimitrios Gunter MD    folic acid (FOLVITE) tablet 1 mg, 1 mg, Oral, Daily, Dimitrios Gunter MD, 1 mg at 09/13/24 0923    folic acid 1 mg in sodium chloride 0.9 % 50 mL IVPB, 1 mg, Intravenous, Once, Dimitrios Gunter MD    gabapentin (NEURONTIN) capsule 100 mg, 100 mg, Oral, Nightly, Dimitrios Gunter MD, 100 mg at 09/13/24 0123    glucagon (GLUCAGEN) injection 1 mg, 1 mg, Intramuscular, Q15 Min PRN, Dimitrios Gunter MD    HYDROcodone-acetaminophen (NORCO) 5-325 MG per tablet 1 tablet, 1 tablet, Oral, Q6H PRN, Dimitrios Gunter MD, 1 tablet at 09/13/24 0923    insulin lispro (HUMALOG/ADMELOG) injection 2-7 Units, 2-7 Units, Subcutaneous, 4x Daily AC & at Bedtime, Dimitrios Gunter MD    loperamide (IMODIUM) capsule 2 mg, 2 mg, Oral, 4x Daily PRN, Dimitrios Gunter MD    Magnesium Standard Dose Replacement - Follow Nurse / BPA Driven Protocol, , Does not apply, PRN, Dimitrios Gunter MD    melatonin tablet 2.5 mg, 2.5 mg, Oral, Nightly, Dimitrios Gunter MD    [Held by provider] midodrine (PROAMATINE) tablet 5 mg, 5 mg, Oral, TID AC, Dimitrios Gunter MD    ondansetron ODT (ZOFRAN-ODT) disintegrating tablet 4 mg, 4 mg, Oral, Q6H PRN **OR** ondansetron (ZOFRAN) injection 4 mg, 4 mg, Intravenous, Q6H PRN, Dimitrios Gunter MD    Phosphorus Replacement - Follow Nurse /  BPA Driven Protocol, , Does not apply, PRN, Dimitrios Gunter MD    Potassium Replacement - Follow Nurse / BPA Driven Protocol, , Does not apply, PRN, Dimitrios Gunter MD    sodium chloride 0.9 % flush 3 mL, 3 mL, Intravenous, Q12H, Dimitrios Gunter MD, 3 mL at 09/13/24 0937    sodium chloride 0.9 % flush 3-10 mL, 3-10 mL, Intravenous, PRN, Dimitrios Gunter MD    sodium chloride 0.9 % infusion 40 mL, 40 mL, Intravenous, PRN, Dimitrios Gunter MD    sodium zirconium cyclosilicate (LOKELMA) packet 10 g, 10 g, Oral, Once, Dimitrios Gunter MD    PRN meds    acetaminophen **OR** acetaminophen **OR** acetaminophen    senna-docusate sodium **AND** polyethylene glycol **AND** bisacodyl **AND** bisacodyl    Calcium Replacement - Follow Nurse / BPA Driven Protocol    dextrose    dextrose    famotidine    glucagon (human recombinant)    HYDROcodone-acetaminophen    loperamide    Magnesium Standard Dose Replacement - Follow Nurse / BPA Driven Protocol    ondansetron ODT **OR** ondansetron    Phosphorus Replacement - Follow Nurse / BPA Driven Protocol    Potassium Replacement - Follow Nurse / BPA Driven Protocol    sodium chloride    sodium chloride    No current facility-administered medications on file prior to encounter.     Current Outpatient Medications on File Prior to Encounter   Medication Sig    Antacid Extra Strength 750 MG chewable tablet     gabapentin (NEURONTIN) 100 MG capsule     potassium chloride 10 MEQ CR tablet Take 1 tablet by mouth.    sodium bicarbonate 650 MG tablet Take 1 tablet by mouth.    [DISCONTINUED] gabapentin (NEURONTIN) 300 MG capsule     midodrine (PROAMATINE) 5 MG tablet Take 1 tablet by mouth 3 (Three) Times a Day.    [DISCONTINUED] lidocaine (LMX) 4 % cream        General appearance: Elderly male, NAD, alert and cooperative  HEENT: Normocephalic, atraumatic    Neurological:   MS: oriented x3, normal attention/concentration, language intact, no  "neglect, normal fund of knowledge  CN: visual fields full, EOMI, facial sensation equal, no facial droop, shoulder shrug equal, tongue midline  Motor: 5/5 upper extremities, 3/5 distally of both lower extremities  Reflexes: diminished reflexes in lower extremities, 1+ upper extremities  Sensory: Absent vibratory and cold temperature sensation of lower extremities, diminished of left upper extremity  Coordination: Normal finger to nose test  Gait and station: deferred  Rapid alternating movements: normal finger to thumb tap    Laboratory results:  Lab Results   Component Value Date    GLUCOSE 96 09/13/2024    CALCIUM 7.4 (L) 09/13/2024     09/13/2024    K 4.7 09/13/2024    CO2 10.0 (L) 09/13/2024     (H) 09/13/2024    BUN 39 (H) 09/13/2024    CREATININE 16.73 (H) 09/13/2024    EGFRIFAFRI 6 (L) 10/19/2022    BCR 2.3 (L) 09/13/2024    ANIONGAP 22.0 (H) 09/13/2024     Lab Results   Component Value Date    WBC 5.07 09/13/2024    HGB 8.7 (L) 09/13/2024    HCT 28.6 (L) 09/13/2024    .1 (H) 09/13/2024    PLT 81 (L) 09/13/2024     No results found for: \"CHOL\"  No results found for: \"HDL\"  No results found for: \"LDL\"  No results found for: \"TRIG\"  Lab Results   Component Value Date    HGBA1C 4.50 (L) 09/12/2024     Lab Results   Component Value Date    INR 0.9 02/09/2022    INR 0.9 01/27/2022    INR 1.0 09/28/2021    PROTIME 10.7 02/09/2022    PROTIME 10.6 01/27/2022    PROTIME 11.7 09/28/2021     Lab Results   Component Value Date    GBASOOYY39 <150 (L) 09/13/2024     No results found for: \"TSH\"  Pain Management Panel          Latest Ref Rng & Units 9/7/2019   Pain Management Panel   Amphetamine, Urine Qual Negative (arb'U) Negative       Barbiturates Screen, Urine Negative ng/mL Negative       Benzodiazepine Screen, Urine Negative ng/mL Negative       Cocaine Screen, Urine Negative Negative          Details          This result is from an external source.              Brief Urine Lab Results       None "          Folate 5.44    Lab review: I personally reviewed all labs as documented above.    Imaging review:   CT Lumbar Spine Without Contrast    Result Date: 9/12/2024   1. No acute fracture or subluxation of the lumbar spine. 2. Multilevel lumbar spondylosis, most prominent at L2-L3, as detailed above. 3. Bilateral renal parenchymal atrophy with multiple nonobstructing calcifications in both kidneys. 4. Moderate to severe calcified atherosclerotic disease.  This report was finalized on 9/12/2024 9:00 PM by Donald Henderson MD on Workstation: [id number]       Diagnosis:  Neuropathy  Severe B12 deficiency  ESRD on HD  Hyperkalemia   History of alcohol dependence    Comment: Suspect neuropathy multifactorial from severe B12 deficiency, long-term history of alcohol dependence, and possibly hyperkalemia. He is currently on IM replacement for his B12 and reports sobriety from alcohol for several years. Would recommend continued f/u outpatient for further monitoring and treatment of his B12 deficiency.     PLAN:   - Cyanocobalamin 1000mcg IM daily x5 days and then start PO replacement daily, will need f/u with PCP to continue monitoring for treatment  - Nephrology following, dialysis planned for today and is now on a low potassium diet.  We will sign off and see again per request.     Case discussed with patient and Dr. Cabello and she agrees with plan above.    SKIP Flanagan

## 2024-09-14 VITALS
SYSTOLIC BLOOD PRESSURE: 135 MMHG | HEART RATE: 87 BPM | TEMPERATURE: 98.1 F | DIASTOLIC BLOOD PRESSURE: 78 MMHG | OXYGEN SATURATION: 99 % | WEIGHT: 149 LBS | HEIGHT: 71 IN | BODY MASS INDEX: 20.86 KG/M2 | RESPIRATION RATE: 16 BRPM

## 2024-09-14 LAB
ALBUMIN SERPL-MCNC: 2.5 G/DL (ref 3.5–5.2)
ANION GAP SERPL CALCULATED.3IONS-SCNC: 11.4 MMOL/L (ref 5–15)
BUN SERPL-MCNC: 16 MG/DL (ref 6–20)
BUN/CREAT SERPL: 1.9 (ref 7–25)
CALCIUM SPEC-SCNC: 7.6 MG/DL (ref 8.6–10.5)
CHLORIDE SERPL-SCNC: 98 MMOL/L (ref 98–107)
CO2 SERPL-SCNC: 23.6 MMOL/L (ref 22–29)
CREAT SERPL-MCNC: 8.23 MG/DL (ref 0.76–1.27)
DEPRECATED RDW RBC AUTO: 66.2 FL (ref 37–54)
EGFRCR SERPLBLD CKD-EPI 2021: 7 ML/MIN/1.73
ERYTHROCYTE [DISTWIDTH] IN BLOOD BY AUTOMATED COUNT: 18.9 % (ref 12.3–15.4)
GLUCOSE BLDC GLUCOMTR-MCNC: 106 MG/DL (ref 70–130)
GLUCOSE BLDC GLUCOMTR-MCNC: 86 MG/DL (ref 70–130)
GLUCOSE BLDC GLUCOMTR-MCNC: 87 MG/DL (ref 70–130)
GLUCOSE SERPL-MCNC: 82 MG/DL (ref 65–99)
HCT VFR BLD AUTO: 28.2 % (ref 37.5–51)
HGB BLD-MCNC: 9.1 G/DL (ref 13–17.7)
MCH RBC QN AUTO: 31.6 PG (ref 26.6–33)
MCHC RBC AUTO-ENTMCNC: 32.3 G/DL (ref 31.5–35.7)
MCV RBC AUTO: 97.9 FL (ref 79–97)
PHOSPHATE SERPL-MCNC: 6.3 MG/DL (ref 2.5–4.5)
PLATELET # BLD AUTO: 59 10*3/MM3 (ref 140–450)
PMV BLD AUTO: 9.5 FL (ref 6–12)
POTASSIUM SERPL-SCNC: 3.3 MMOL/L (ref 3.5–5.2)
RBC # BLD AUTO: 2.88 10*6/MM3 (ref 4.14–5.8)
SODIUM SERPL-SCNC: 133 MMOL/L (ref 136–145)
WBC NRBC COR # BLD AUTO: 3.53 10*3/MM3 (ref 3.4–10.8)

## 2024-09-14 PROCEDURE — 96372 THER/PROPH/DIAG INJ SC/IM: CPT

## 2024-09-14 PROCEDURE — 97110 THERAPEUTIC EXERCISES: CPT

## 2024-09-14 PROCEDURE — 85027 COMPLETE CBC AUTOMATED: CPT | Performed by: HOSPITALIST

## 2024-09-14 PROCEDURE — G0378 HOSPITAL OBSERVATION PER HR: HCPCS

## 2024-09-14 PROCEDURE — 97165 OT EVAL LOW COMPLEX 30 MIN: CPT

## 2024-09-14 PROCEDURE — 80069 RENAL FUNCTION PANEL: CPT | Performed by: INTERNAL MEDICINE

## 2024-09-14 PROCEDURE — 82948 REAGENT STRIP/BLOOD GLUCOSE: CPT

## 2024-09-14 PROCEDURE — 25010000002 CYANOCOBALAMIN PER 1000 MCG: Performed by: INTERNAL MEDICINE

## 2024-09-14 RX ORDER — SEVELAMER CARBONATE 800 MG/1
2400 TABLET, FILM COATED ORAL
Qty: 270 TABLET | Refills: 1 | Status: SHIPPED | OUTPATIENT
Start: 2024-09-14

## 2024-09-14 RX ORDER — LANOLIN ALCOHOL/MO/W.PET/CERES
1000 CREAM (GRAM) TOPICAL DAILY
Qty: 30 TABLET | Refills: 1 | Status: SHIPPED | OUTPATIENT
Start: 2024-09-14

## 2024-09-14 RX ORDER — FOLIC ACID 1 MG/1
1 TABLET ORAL DAILY
Qty: 30 TABLET | Refills: 1 | Status: SHIPPED | OUTPATIENT
Start: 2024-09-15

## 2024-09-14 RX ADMIN — FOLIC ACID 1 MG: 1 TABLET ORAL at 08:18

## 2024-09-14 RX ADMIN — CYANOCOBALAMIN 1000 MCG: 1000 INJECTION INTRAMUSCULAR; SUBCUTANEOUS at 08:18

## 2024-09-14 RX ADMIN — HYDROCODONE BITARTRATE AND ACETAMINOPHEN 1 TABLET: 5; 325 TABLET ORAL at 14:25

## 2024-09-14 RX ADMIN — HYDROCODONE BITARTRATE AND ACETAMINOPHEN 1 TABLET: 5; 325 TABLET ORAL at 02:09

## 2024-09-14 RX ADMIN — SEVELAMER CARBONATE 2400 MG: 800 TABLET, FILM COATED ORAL at 14:25

## 2024-09-14 RX ADMIN — SEVELAMER CARBONATE 2400 MG: 800 TABLET, FILM COATED ORAL at 08:18

## 2024-09-14 RX ADMIN — SENNOSIDES AND DOCUSATE SODIUM 2 TABLET: 50; 8.6 TABLET ORAL at 08:18

## 2024-09-14 NOTE — THERAPY TREATMENT NOTE
Patient Name: Viet Guerra Jr.  : 1965    MRN: [id number]                              Today's Date: 2024       Admit Date: 2024    Visit Dx:     ICD-10-CM ICD-9-CM   1. Frequent falls  R29.6 V15.88   2. Acute midline low back pain with bilateral sciatica  M54.42 724.2    M54.41 724.3   3. Neuropathy involving both lower extremities  G57.93 356.9   4. ESRD on hemodialysis  N18.6 585.6    Z99.2 V45.11     Patient Active Problem List   Diagnosis    Frequent falls    Bilateral leg numbness, acute on chronic    General weakness    History of alcohol dependence, reports 5-year sobriety    ESRD (end stage renal disease) on dialysis    B12 deficiency    Neuropathy    Macrocytic anemia    Metabolic acidosis    Hyperkalemia    Thrombocytopenia     Past Medical History:   Diagnosis Date    Diabetes mellitus     ESRD (end stage renal disease) on dialysis     Renal disorder      History reviewed. No pertinent surgical history.   General Information       Row Name 24 1216          Physical Therapy Time and Intention    Document Type therapy note (daily note)  -MP     Mode of Treatment physical therapy;individual therapy  -MP       Row Name 24 1216          General Information    Patient Profile Reviewed yes  -MP     Existing Precautions/Restrictions fall  -MP       Row Name 24 1216          Cognition    Orientation Status (Cognition) oriented x 3  -MP       Row Name 24 1216          Safety Issues, Functional Mobility    Impairments Affecting Function (Mobility) balance;endurance/activity tolerance;pain;strength  -MP               User Key  (r) = Recorded By, (t) = Taken By, (c) = Cosigned By      Initials Name Provider Type    Renee Brown PT Physical Therapist                   Mobility       Row Name 24 1216          Bed Mobility    Bed Mobility supine-sit;sit-supine  -MP     Supine-Sit [place] (Bed Mobility) standby assist;verbal cues  -MP     Sit-Supine  [place] (Bed Mobility) standby assist;verbal cues;1 person assist  -MP     Assistive Device (Bed Mobility) bed rails;head of bed elevated  -MP       Row Name 09/14/24 1216          Sit-Stand Transfer    Sit-Stand [place] (Transfers) contact guard;verbal cues  -MP     Assistive Device (Sit-Stand Transfers) walker, front-wheeled  -MP       Row Name 09/14/24 1216          Gait/Stairs (Locomotion)    [place] Level (Gait) not tested  -MP               User Key  (r) = Recorded By, (t) = Taken By, (c) = Cosigned By      Initials Name Provider Type    Renee Brown PT Physical Therapist                   Obj/Interventions       Row Name 09/14/24 1216          Motor Skills    Therapeutic Exercise other (see comments)  seated LAQ, heel raises, toe raises, marches x10 B LE; standing marches and heel raises x10 each; STS x2  -MP       Row Name 09/14/24 1216          Balance    Balance Assessment sitting static balance;standing static balance  -MP     Static Sitting Balance standby assist;verbal cues;1-person assist  -MP     Position, Sitting Balance sitting edge of bed  -MP     Static Standing Balance contact guard;verbal cues;1-person assist  -MP     Dynamic Standing Balance minimal assist;verbal cues;1-person assist;contact guard  -MP     Position/Device Used, Standing Balance walker, front-wheeled  -MP               User Key  (r) = Recorded By, (t) = Taken By, (c) = Cosigned By      Initials Name Provider Type    Renee Brown PT Physical Therapist                   Goals/Plan       Row Name 09/14/24 1219          Bed Mobility Goal 1 (PT)    Activity/Assistive Device (Bed Mobility Goal 1, PT) sit to supine;supine to sit  -MP     [place] Level/Cues Needed (Bed Mobility Goal 1, PT) independent  -MP     Time Frame (Bed Mobility Goal 1, PT) 1 week  -MP       Row Name 09/14/24 1219          Transfer Goal 1 (PT)    Activity/Assistive Device (Transfer Goal 1, PT)  sit-to-stand/stand-to-sit;bed-to-chair/chair-to-bed  -MP     [place] Level/Cues Needed (Transfer Goal 1, PT) standby assist  -MP     Time Frame (Transfer Goal 1, PT) 1 week  -MP       Row Name 09/14/24 1219          Gait Training Goal 1 (PT)    Activity/Assistive Device (Gait Training Goal 1, PT) gait (walking locomotion);walker, rolling  -MP     [place] Level (Gait Training Goal 1, PT) standby assist  -MP     Distance (Gait Training Goal 1, PT) 75  -MP     Time Frame (Gait Training Goal 1, PT) 1 week  -MP       Row Name 09/14/24 1219          Therapy Assessment/Plan (PT)    Planned Therapy Interventions (PT) balance training;bed mobility training;gait training;home exercise program;patient/family education;stretching;strengthening;ROM (range of motion);stair training;neuromuscular re-education;postural re-education;manual therapy techniques;transfer training  -MP               User Key  (r) = Recorded By, (t) = Taken By, (c) = Cosigned By      Initials Name Provider Type    Renee Brown, PT Physical Therapist                   Clinical Impression       Row Name 09/14/24 1217          Pain    Pain Intervention(s) Repositioned;Ambulation/increased activity  -MP     Additional Documentation Pain Scale: FACES Pre/Post-Treatment (Group)  -MP       Row Name 09/14/24 1217          Pain Scale: FACES Pre/Post-Treatment    Pain: FACES Scale, Pretreatment 6-->hurts even more  -MP     Posttreatment Pain Rating 6-->hurts even more  -MP       Row Name 09/14/24 1217          Plan of Care Review    Plan of Care Reviewed With patient  -MP     Outcome Evaluation Pt. agreeable to PT this date though declines ambulation out of room 2' fatigue. Performed seated LAQ, heel raises, toe raises, marches x10 B LE; standing marches and heel raises x10 each; STS x2. Noted knee buckling with standing ther ex and discussed importance of use of rwx upon D/C for safety due to falls risk. Pt. reports he does have a walker at home.  Dicussed potential benefit from D/C to rehab due to LE weakness, falls risk, and improve safety awareness though pt. verbalizes desire to D/C home.  -MP       Row Name 09/14/24 1217          Therapy Assessment/Plan (PT)    Rehab Potential (PT) good, to achieve stated therapy goals  -MP     Criteria for Skilled Interventions Met (PT) yes  -MP     Therapy Frequency (PT) 6 times/wk  -MP       Row Name 09/14/24 1217          Positioning and Restraints    Pre-Treatment Position in bed  -MP     Post Treatment Position bed  -MP     In Bed supine;call light within reach;encouraged to call for assist;exit alarm on  -MP               User Key  (r) = Recorded By, (t) = Taken By, (c) = Cosigned By      Initials Name Provider Type    Renee Brown PT Physical Therapist                   Outcome Measures       Row Name 09/14/24 1219 09/14/24 0810       How much help from another person do you currently need...    Turning from your back to your side while in flat bed without using bedrails? 4  -MP 4  -EV    Moving from lying on back to sitting on the side of a flat bed without bedrails? 4  -MP 4  -EV    Moving to and from a bed to a chair (including a wheelchair)? 3  -MP 4  -EV    Standing up from a chair using your arms (e.g., wheelchair, bedside chair)? 3  -MP 4  -EV    Climbing 3-5 steps with a railing? 2  -MP 3  -EV    To walk in hospital room? 3  -MP 3  -EV    AM-PAC 6 Clicks Score (PT) 19  -MP 22  -EV    Highest Level of Mobility Goal 6 --> Walk 10 steps or more  -MP 7 --> Walk 25 feet or more  -EV      Row Name 09/14/24 1219 09/14/24 0848       Functional Assessment    Outcome Measure Options AM-PAC 6 Clicks Basic Mobility (PT)  -MP AM-PAC 6 Clicks Daily Activity (OT)  -CW              User Key  (r) = Recorded By, (t) = Taken By, (c) = Cosigned By      Initials Name Provider Type    Natalie Zhang OTR Occupational Therapist    Kodi Ruggiero, RN Registered Nurse    Renee Brown PT Physical Therapist                                  Physical Therapy Education       Title: PT OT SLP Therapies (Done)       Topic: Physical Therapy (Done)       Point: Mobility training (Done)       Learning Progress Summary             Patient Acceptance, E,TB,D, DU,VU,NR by  at 9/14/2024 1220                         Point: Home exercise program (Done)       Learning Progress Summary             Patient Acceptance, E,TB,D, DU,VU,NR by  at 9/14/2024 1220                         Point: Body mechanics (Done)       Learning Progress Summary             Patient Acceptance, E,TB,D, DU,VU,NR by  at 9/14/2024 1220                         Point: Precautions (Done)       Learning Progress Summary             Patient Acceptance, E,TB,D, DU,VU,NR by  at 9/14/2024 1220                                         User Key       Initials Effective Dates Name Provider Type Discipline     02/07/24 -  Renee Palm, PT Physical Therapist PT                  PT Recommendation and Plan  Planned Therapy Interventions (PT): balance training, bed mobility training, gait training, home exercise program, patient/family education, stretching, strengthening, ROM (range of motion), stair training, neuromuscular re-education, postural re-education, manual therapy techniques, transfer training  Plan of Care Reviewed With: patient  Outcome Evaluation: Pt. agreeable to PT this date though declines ambulation out of room 2' fatigue. Performed seated LAQ, heel raises, toe raises, marches x10 B LE; standing marches and heel raises x10 each; STS x2. Noted knee buckling with standing ther ex and discussed importance of use of rwx upon D/C for safety due to falls risk. Pt. reports he does have a walker at home. Dicussed potential benefit from D/C to rehab due to LE weakness, falls risk, and improve safety awareness though pt. verbalizes desire to D/C home.     Time Calculation:         PT Charges       Row Name 09/14/24 1221             Time Calculation    Start  Time 1034  -MP      Stop Time 1042  -MP      Time Calculation (min) 8 min  -MP      PT Received On 09/14/24  -MP      PT - Next Appointment 09/16/24  -MP         Time Calculation- PT    Total Timed Code Minutes- PT 8 minute(s)  -MP         Timed Charges    62715 - PT Therapeutic Exercise Minutes 8  -MP         Total Minutes    Timed Charges Total Minutes 8  -MP       Total Minutes 8  -MP                User Key  (r) = Recorded By, (t) = Taken By, (c) = Cosigned By      Initials Name Provider Type    Renee Brown, PT Physical Therapist                  Therapy Charges for Today       Code Description Service Date Service Provider Modifiers Qty    40584352800 HC PT THER PROC EA 15 MIN 9/14/2024 Renee Palm PT GP 1            PT G-Codes  Outcome Measure Options: AM-PAC 6 Clicks Basic Mobility (PT)  AM-PAC 6 Clicks Score (PT): 19  AM-PAC 6 Clicks Score (OT): 19  PT Discharge Summary  Anticipated Discharge Disposition (PT): skilled nursing facility, inpatient rehabilitation facility    Renee Palm PT  9/14/2024

## 2024-09-14 NOTE — PLAN OF CARE
Goal Outcome Evaluation:      Pt refusing Rehab and SNF. Has a walker at home. No family to transport home. Voucher for cab obtained.

## 2024-09-14 NOTE — PLAN OF CARE
Goal Outcome Evaluation:      Resting comfortably in bed. VSS. Norco q 6 PRN for pain. Up with assist x1 and walker to bathroom.Will continue to monitor.

## 2024-09-14 NOTE — THERAPY EVALUATION
Patient Name: Viet Guerra Jr.  : 1965    MRN: [id number]                              Today's Date: 2024       Admit Date: 2024    Visit Dx:     ICD-10-CM ICD-9-CM   1. Frequent falls  R29.6 V15.88   2. Acute midline low back pain with bilateral sciatica  M54.42 724.2    M54.41 724.3   3. Neuropathy involving both lower extremities  G57.93 356.9   4. ESRD on hemodialysis  N18.6 585.6    Z99.2 V45.11     Patient Active Problem List   Diagnosis    Frequent falls    Bilateral leg numbness, acute on chronic    General weakness    History of alcohol dependence, reports 5-year sobriety    ESRD (end stage renal disease) on dialysis    B12 deficiency    Neuropathy    Macrocytic anemia    Metabolic acidosis    Hyperkalemia    Thrombocytopenia     Past Medical History:   Diagnosis Date    Diabetes mellitus     ESRD (end stage renal disease) on dialysis     Renal disorder      History reviewed. No pertinent surgical history.   General Information       Row Name 24 0836          OT Time and Intention    Document Type evaluation  -CW     Mode of Treatment occupational therapy  -CW       Row Name 24 0836          General Information    Patient Profile Reviewed yes  -CW     Prior Level of Function independent:;ADL's  -CW     Existing Precautions/Restrictions fall  -CW       Row Name 24 0836          Cognition    Orientation Status (Cognition) oriented x 3  -CW       Row Name 24 0836          Safety Issues, Functional Mobility    Safety Issues Affecting Function (Mobility) safety precaution awareness  -CW     Impairments Affecting Function (Mobility) balance;endurance/activity tolerance;pain;strength  -CW               User Key  (r) = Recorded By, (t) = Taken By, (c) = Cosigned By      Initials Name Provider Type    CW Natalie Diaz OTR Occupational Therapist                     Mobility/ADL's       Row Name 24 0837          Bed Mobility    Supine-Sit [place] (Bed  Mobility) standby assist;verbal cues  -CW     Assistive Device (Bed Mobility) bed rails;head of bed elevated  -CW       Row Name 09/14/24 0837          Sit-Stand Transfer    Sit-Stand [place] (Transfers) contact guard;verbal cues  -CW     Assistive Device (Sit-Stand Transfers) walker, front-wheeled  -CW       Row Name 09/14/24 0837          Activities of Daily Living    BADL Assessment/Intervention lower body dressing;grooming  -CW       Row Name 09/14/24 0837          Lower Body Dressing Assessment/Training    [place] Level (Lower Body Dressing) lower body dressing skills;standby assist;socks;doff;don  -CW     Position (Lower Body Dressing) edge of bed sitting  -CW       Row Name 09/14/24 0837          Grooming Assessment/Training    [place] Level (Grooming) grooming skills;wash face, hands;set up  -CW               User Key  (r) = Recorded By, (t) = Taken By, (c) = Cosigned By      Initials Name Provider Type    CW Natalie Diaz OTR Occupational Therapist                   Obj/Interventions       Row Name 09/14/24 0839          Range of Motion Comprehensive    Comment, General Range of Motion BUE limited end range shoulder flex/ext. otherwise WFL's  -CW       Row Name 09/14/24 0839          Strength Comprehensive (MMT)    Comment, General Manual Muscle Testing (MMT) Assessment BUE 3+/5 general  -CW       Row Name 09/14/24 0839          Motor Skills    Motor Skills functional endurance  -CW     Functional Endurance fair  -CW       Row Name 09/14/24 0839          Balance    Static Standing Balance contact guard  -CW     Dynamic Standing Balance minimal assist  -CW     Position/Device Used, Standing Balance walker, front-wheeled  -CW     Balance Interventions occupation based/functional task  -CW               User Key  (r) = Recorded By, (t) = Taken By, (c) = Cosigned By      Initials Name Provider Type    CW Natalie Diaz OTR Occupational Therapist                   Goals/Plan       Row Name  09/14/24 0847          Transfer Goal 1 (OT)    Activity/Assistive Device (Transfer Goal 1, OT) transfers, all;sit-to-stand/stand-to-sit;bed-to-chair/chair-to-bed;toilet;commode;walker, rolling  -CW     [place] Level/Cues Needed (Transfer Goal 1, OT) modified independence  -CW     Time Frame (Transfer Goal 1, OT) 2 weeks  -CW       Row Name 09/14/24 0847          Dressing Goal 1 (OT)    Activity/Device (Dressing Goal 1, OT) dressing skills, all;upper body dressing;lower body dressing  -CW     [place]/Cues Needed (Dressing Goal 1, OT) modified independence;set-up required  -CW     Time Frame (Dressing Goal 1, OT) 2 weeks  -CW       Row Name 09/14/24 0847          Therapy Assessment/Plan (OT)    Planned Therapy Interventions (OT) activity tolerance training;BADL retraining;functional balance retraining;patient/caregiver education/training  -CW               User Key  (r) = Recorded By, (t) = Taken By, (c) = Cosigned By      Initials Name Provider Type    CW Natalie Diaz OTR Occupational Therapist                   Clinical Impression       Row Name 09/14/24 0840          Pain Assessment    Pretreatment Pain Rating 8/10  -CW     Posttreatment Pain Rating 8/10  -CW     Pain Location - back  -CW     Pain Intervention(s) Repositioned  -CW       Row Name 09/14/24 0840          Plan of Care Review    Plan of Care Reviewed With patient  -CW     Outcome Evaluation Pt. admitted to [facility] with ERSD, frequent falls, low back pain. Pt. lives with  sibling-sister and was indep. with self care at home per pt. Pt. presents with generalized weakness, impaired balance and decreased ADL performance. LE dressing SBA seated EOB. Grooming indep. after set up.  CGA sit to stand and transferred to chair with Rwx CGA. Functional endurance fair. Pt. may benefit from OT to maximize indep. with self care, safety functional transfers due to hx falls. Anticipate discharge to home with HH or rehab depending on progress. Pt. has no AD at  home per pt.  -CW       Row Name 09/14/24 0840          Therapy Assessment/Plan (OT)    Therapy Frequency (OT) 5 times/wk  -CW       Row Name 09/14/24 0840          Positioning and Restraints    Pre-Treatment Position in bed  -CW     Post Treatment Position bed  -CW     In Bed call light within reach;supine;encouraged to call for assist;exit alarm on  -CW               User Key  (r) = Recorded By, (t) = Taken By, (c) = Cosigned By      Initials Name Provider Type    Natalie Zhang OTR Occupational Therapist                   Outcome Measures       Row Name 09/14/24 0848          How much help from another is currently needed...    Putting on and taking off regular lower body clothing? 3  -CW     Bathing (including washing, rinsing, and drying) 3  -CW     Toileting (which includes using toilet bed pan or urinal) 3  -CW     Putting on and taking off regular upper body clothing 3  -CW     Taking care of personal grooming (such as brushing teeth) 3  -CW     Eating meals 4  -CW     AM-PAC 6 Clicks Score (OT) 19  -CW       Row Name 09/14/24 0848          Functional Assessment    Outcome Measure Options AM-PAC 6 Clicks Daily Activity (OT)  -CW               User Key  (r) = Recorded By, (t) = Taken By, (c) = Cosigned By      Initials Name Provider Type    Natalie Zhang OTR Occupational Therapist                    Occupational Therapy Education       Title: PT OT SLP Therapies (Done)       Topic: Occupational Therapy (Done)       Point: ADL training (Done)       Description:   Instruct learner(s) on proper safety adaptation and remediation techniques during self care or transfers.   Instruct in proper use of assistive devices.                  Learning Progress Summary             Patient Acceptance, E, VU by VIKI at 9/14/2024 0849    Comment: Role of OT and poc.                                         User Key       Initials Effective Dates Name Provider Type Discipline    VIKI 06/16/21 -  Natalie Diaz OTR  Occupational Therapist OT                  OT Recommendation and Plan  Planned Therapy Interventions (OT): activity tolerance training, BADL retraining, functional balance retraining, patient/caregiver education/training  Therapy Frequency (OT): 5 times/wk  Plan of Care Review  Plan of Care Reviewed With: patient  Outcome Evaluation: Pt. admitted to [facility] with ERSD, frequent falls, low back pain. Pt. lives with  sibling-sister and was indep. with self care at home per pt. Pt. presents with generalized weakness, impaired balance and decreased ADL performance. LE dressing SBA seated EOB. Grooming indep. after set up.  CGA sit to stand and transferred to chair with Rwx CGA. Functional endurance fair. Pt. may benefit from OT to maximize indep. with self care, safety functional transfers due to hx falls. Anticipate discharge to home with HH or rehab depending on progress. Pt. has no AD at home per pt.     Time Calculation:         Time Calculation- OT       Row Name 09/14/24 0849             Time Calculation- OT    OT Start Time 0819  -CW      OT Stop Time 0833  -CW      OT Time Calculation (min) 14 min  -CW      Total Timed Code Minutes- OT 4 minute(s)  -CW      OT Received On 09/14/24  -CW      OT - Next Appointment 09/16/24  -CW      OT Goal Re-Cert Due Date 09/28/24  -CW         Timed Charges    98415 - OT Self Care/Mgmt Minutes 4  -CW         Total Minutes    Timed Charges Total Minutes 4  -CW       Total Minutes 4  -CW                User Key  (r) = Recorded By, (t) = Taken By, (c) = Cosigned By      Initials Name Provider Type    CW Natalie Diaz OTR Occupational Therapist                  Therapy Charges for Today       Code Description Service Date Service Provider Modifiers Qty    [id number]  OT EVAL LOW COMPLEXITY 2 9/14/2024 Natalie Diaz OTR GO 1                 RONALD Tobias  9/14/2024

## 2024-09-14 NOTE — NURSING NOTE
Hemodialysis treatment completed, vs stable throughout 0.5L net fluid removed, no issues with access flow or bleeding, no complications

## 2024-09-14 NOTE — DISCHARGE SUMMARY
"    Patient Name: Viet Guerra Jr.  : 1965  MRN: [id number]    Date of Admission: 2024  Date of Discharge:  2024  Primary Care Physician: Zahida Sanchez APRN      Chief Complaint:   Foot Pain      Discharge Diagnoses     Active Hospital Problems    Diagnosis  POA    **Frequent falls [R29.6]  Not Applicable    Bilateral leg numbness, acute on chronic [R20.0]  Yes    General weakness [R53.1]  Yes    History of alcohol dependence, reports 5-year sobriety [F10.21]  Yes    B12 deficiency [E53.8]  Yes    Neuropathy [G62.9]  Yes    Macrocytic anemia [D53.9]  Yes    Metabolic acidosis [E87.20]  Yes    Hyperkalemia [E87.5]  Yes    Thrombocytopenia [D69.6]  Yes    ESRD (end stage renal disease) on dialysis [N18.6, Z99.2]  Not Applicable      Resolved Hospital Problems   No resolved problems to display.        Admitting HPI     \"Viet Guerra Jr. is a 58 y.o. male with past medical history of reported end-stage renal disease with reported chronic leg numbness presents to the hospital with worsening bilateral leg pain and generalized leg weakness with increasing numbness.  Patient reports multiple falls but no major injuries.  Patient reports being evaluated at other emergency room's and sent home.  Patient reports he goes to dialysis  and is due for dialysis tomorrow.  He feels he is unable to care for himself in his current state and reports he lives with family.  No reported seizures and unilateral weakness, unilateral numbness, vision changes \"    Hospital Course     Pt admitted for frequent falls and weakness. Seen in consult with Neuro and Nephrology. B12 noted to be low and he has been started on replacement. Additionally plans to switch from 2x to 3x daily HD. Pt evaluated by PT who recommended SNF. I discussed this recommendation with him, however he was adamant about going home. At this point he is stable from a medical standpoint. I worry longterm about his ongoing " risk for falls, however he is currently of sound mind and will discharge home per his request at this time.     Day of Discharge     Physical Exam:  Temp:  [97.6 °F (36.4 °C)-98.3 °F (36.8 °C)] 98.1 °F (36.7 °C)  Heart Rate:  [66-87] 87  Resp:  [16] 16  BP: (124-143)/(71-85) 135/78  Body mass index is 20.78 kg/m².  Physical Exam  Constitutional:       Appearance: He is ill-appearing.   Pulmonary:      Effort: Pulmonary effort is normal. No respiratory distress.      Breath sounds: No stridor.   Skin:     Coloration: Skin is not pale.   Neurological:      Mental Status: He is alert and oriented to person, place, and time.         Consultants     Consult Orders (all) (From admission, onward)       Start     Ordered    09/13/24 1159  Inpatient Case Management  Consult  Once        Provider:  (Not yet assigned)    09/13/24 1159    09/13/24 0917  Inpatient Nephrology Consult  Once        Specialty:  Nephrology  Provider:  Ki Gallardo MD    09/13/24 0917 09/12/24 2200  Inpatient Neurology Consult General  Once        Specialty:  Neurology  Provider:  Helen Cabello MD    09/12/24 2159 09/12/24 2158  Inpatient Nephrology Consult  Once,   Status:  Canceled        Specialty:  Nephrology  Provider:  Jarvis Royal MD    09/12/24 2159 09/12/24 2119  [facility] (on-call MD unless specified) Details  Once,   Status:  Canceled        Specialty:  Hospitalist  Provider:  (Not yet assigned)    09/12/24 2118 09/12/24 1814  [facility] (on-call MD unless specified) Details  Once,   Status:  Canceled        Specialty:  Hospitalist  Provider:  Dimitrios Gunter MD    09/12/24 1813                  Procedures     * Surgery not found *      Imaging Results (All)       Procedure Component Value Units Date/Time    CT Lumbar Spine Without Contrast [[id number]] Collected: 09/12/24 2054     Updated: 09/12/24 2103    Narrative:      CT LUMBAR SPINE WO CONTRAST-     DATE OF EXAM: 09/12/2024 7:21 PM      INDICATION: low back pain radiating to legs. Bilateral lower extremity  pain for 3 days. Increased pain with tingling with walking.     COMPARISON: None available.     TECHNIQUE: Multiple contiguous axial images were acquired through the  lumbar spine without the intravenous administration of contrast.  Reformatted coronal and sagittal sequences were also reviewed. Radiation  dose reduction techniques were utilized, including automated exposure  control and exposure modulation based on body size.     FINDINGS:  Normal variant L3 limbus vertebra. Multilevel endplate irregularities,  consistent with Schmorl's nodes. The lumbar vertebral bodies otherwise  demonstrate well preserved height and alignment. No evidence of acute  fracture of the lumbar spine. No concerning osseous lesion.     L1-L2: Mild bilateral facet and ligamentous hypertrophy, without  significant spinal canal stenosis or neural foraminal narrowing.  L2-L3: Mild diffuse disc bulge with bilateral facet hypertrophy.  Findings result in mild indentation of the thecal sac and moderate to  severe bilateral neural foraminal narrowing.  L3-L4: Diffuse disc bulge with bilateral facet hypertrophy. Findings  result in mild indentation of the thecal sac and mild bilateral neural  foraminal narrowing.  L4-L5: Diffuse disc bulge with bilateral facet hypertrophy. Findings  result in mild indentation of the thecal sac and mild bilateral neural  renal narrowing.  L5-S1: Diffuse disc bulge with bilateral facet hypertrophy. Findings  result in mild bilateral neural foraminal narrowing. No significant  spinal canal stenosis.     The paraspinal soft tissues are unremarkable. Bilateral renal  parenchymal atrophy with multiple nonobstructing calcifications in both  kidneys. Postoperative changes from cholecystectomy. Moderate to severe  calcified atherosclerotic disease in the abdominal aorta and its  branches without evidence of aneurysm.       Impression:         1. No  "acute fracture or subluxation of the lumbar spine.  2. Multilevel lumbar spondylosis, most prominent at L2-L3, as detailed  above.  3. Bilateral renal parenchymal atrophy with multiple nonobstructing  calcifications in both kidneys.  4. Moderate to severe calcified atherosclerotic disease.     This report was finalized on 9/12/2024 9:00 PM by Donald Henderson MD on  Workstation: [id number]                 Pertinent Labs     Results from last 7 days   Lab Units 09/14/24 0454 09/13/24 0427 09/12/24  1633   WBC 10*3/mm3 3.53 5.07 4.95   HEMOGLOBIN g/dL 9.1* 8.7* 10.4*   PLATELETS 10*3/mm3 59* 81* 72*     Results from last 7 days   Lab Units 09/14/24 0454 09/13/24 0427 09/12/24  1633   SODIUM mmol/L 133* 142 145   POTASSIUM mmol/L 3.3* 4.7 5.5*   CHLORIDE mmol/L 98 110* 115*   CO2 mmol/L 23.6 10.0* 12.7*   BUN mg/dL 16 39* 34*   CREATININE mg/dL 8.23* 16.73* 16.88*   GLUCOSE mg/dL 82 96 82   EGFR mL/min/1.73 7.0* 3.0* 3.0*     Results from last 7 days   Lab Units 09/14/24 0454 09/12/24  1633   ALBUMIN g/dL 2.5* 2.9*   BILIRUBIN mg/dL  --  0.2   ALK PHOS U/L  --  142*   AST (SGOT) U/L  --  7   ALT (SGPT) U/L  --  10     Results from last 7 days   Lab Units 09/14/24 0454 09/13/24 0427 09/12/24  1633 09/08/24  1701   CALCIUM mg/dL 7.6* 7.4* 8.1*  --    ALBUMIN g/dL 2.5*  --  2.9*  --    MAGNESIUM mg/dL  --  2.0  --  1.7*   PHOSPHORUS mg/dL 6.3* 11.2*  --   --        Results from last 7 days   Lab Units 09/12/24  1633   CK TOTAL U/L 113           Invalid input(s): \"LDLCALC\"          Test Results Pending at Discharge       Discharge Details        Discharge Medications        New Medications        Instructions Start Date   folic acid 1 MG tablet  Commonly known as: FOLVITE   1 mg, Oral, Daily   Start Date: September 15, 2024     sevelamer 800 MG tablet  Commonly known as: RENVELA   2,400 mg, Oral, 3 Times Daily With Meals      vitamin B-12 1000 MCG tablet  Commonly known as: CYANOCOBALAMIN   1,000 mcg, Oral, Daily    "          Continue These Medications        Instructions Start Date   gabapentin 100 MG capsule  Commonly known as: NEURONTIN              Stop These Medications      Antacid Extra Strength 750 MG chewable tablet  Generic drug: calcium carbonate EX     midodrine 5 MG tablet  Commonly known as: PROAMATINE     potassium chloride 10 MEQ CR tablet     sodium bicarbonate 650 MG tablet              Allergies   Allergen Reactions    Ibuprofen Other (See Comments)     Other reaction(s): Other (See Comments)   R/T ESRD     R/T ESRD       Discharge Disposition:        Discharge Diet:  Diet Order   Procedures    Diet: Regular/House, Renal; Low Potassium, Low Phosphorus; Fluid Consistency: Thin (IDDSI 0)       Discharge Activity:   Activity Instructions       Activity as Tolerated              CODE STATUS:    Code Status and Medical Interventions: CPR (Attempt to Resuscitate); Full Support   Ordered at: 09/12/24 1355     Level Of Support Discussed With:    Patient     Code Status (Patient has no pulse and is not breathing):    CPR (Attempt to Resuscitate)     Medical Interventions (Patient has pulse or is breathing):    Full Support       No future appointments.   Follow-up Information       Zahida Sanchez APRN Follow up in 1 week(s).    Specialty: Nurse Practitioner  Contact information:  [address], [address]  [place] [address] [phone]                             Time Spent on Discharge:  Greater than 30 minutes spent on discharge management including final examination, discussion of hospital stay and patient education, preparation of records, medication reconciliation, follow up planning      Timur Wei MD  [facility]  09/14/24  18:07 EDT

## 2024-09-14 NOTE — PROGRESS NOTES
"   LOS: 0 days     Chief Complaint/ Reason for encounter: esrd  Chief Complaint   Patient presents with    Foot Pain             Medical history reviewed:  History of Present Illness  [Pt had HD yesterday with 0.5L removed       Subjective    History taken from: Patient and chart    Vital Signs  Temp:  [97.6 °F (36.4 °C)-98.4 °F (36.9 °C)] 97.9 °F (36.6 °C)  Heart Rate:  [66-82] 81  Resp:  [16] 16  BP: (124-143)/(71-85) 141/85       Wt Readings from Last 1 Encounters:   09/12/24 2308 67.6 kg (149 lb)       Objective:  Vital signs: (most recent): Blood pressure 141/85, pulse 81, temperature 97.9 °F (36.6 °C), temperature source Oral, resp. rate 16, height 180.3 cm (71\"), weight 67.6 kg (149 lb), SpO2 99%.                Objective:  Alert and oriented NAD comfortable   eomi no icterus   Moist mucus membranes   No jvd reg s1s2 no murmur   Diminished bases bilaterally no rales/rhonchi/wheeze   Soft NTND + bs   + edema   Warm dry no rash   Normal judgement   Normal mood         Results Review:    Intake/Output:     Intake/Output Summary (Last 24 hours) at 9/14/2024 1224  Last data filed at 9/14/2024 0900  Gross per 24 hour   Intake 480 ml   Output 500 ml   Net -20 ml         DATA:  Labs reviewed     Labs:   Recent Results (from the past 24 hour(s))   Hepatitis B Surface Antigen    Collection Time: 09/13/24  2:43 PM    Specimen: Blood   Result Value Ref Range    Hepatitis B Surface Ag Non-Reactive Non-Reactive   POC Glucose Once    Collection Time: 09/13/24  4:20 PM    Specimen: Blood   Result Value Ref Range    Glucose 99 70 - 130 mg/dL   POC Glucose Once    Collection Time: 09/13/24  8:29 PM    Specimen: Blood   Result Value Ref Range    Glucose 81 70 - 130 mg/dL   Renal Function Panel    Collection Time: 09/14/24  4:54 AM    Specimen: Blood   Result Value Ref Range    Glucose 82 65 - 99 mg/dL    BUN 16 6 - 20 mg/dL    Creatinine 8.23 (H) 0.76 - 1.27 mg/dL    Sodium 133 (L) 136 - 145 mmol/L    Potassium 3.3 (L) 3.5 - " 5.2 mmol/L    Chloride 98 98 - 107 mmol/L    CO2 23.6 22.0 - 29.0 mmol/L    Calcium 7.6 (L) 8.6 - 10.5 mg/dL    Albumin 2.5 (L) 3.5 - 5.2 g/dL    Phosphorus 6.3 (H) 2.5 - 4.5 mg/dL    Anion Gap 11.4 5.0 - 15.0 mmol/L    BUN/Creatinine Ratio 1.9 (L) 7.0 - 25.0    eGFR 7.0 (L) >60.0 mL/min/1.73   CBC (No Diff)    Collection Time: 09/14/24  4:54 AM    Specimen: Blood   Result Value Ref Range    WBC 3.53 3.40 - 10.80 10*3/mm3    RBC 2.88 (L) 4.14 - 5.80 10*6/mm3    Hemoglobin 9.1 (L) 13.0 - 17.7 g/dL    Hematocrit 28.2 (L) 37.5 - 51.0 %    MCV 97.9 (H) 79.0 - 97.0 fL    MCH 31.6 26.6 - 33.0 pg    MCHC 32.3 31.5 - 35.7 g/dL    RDW 18.9 (H) 12.3 - 15.4 %    RDW-SD 66.2 (H) 37.0 - 54.0 fl    MPV 9.5 6.0 - 12.0 fL    Platelets 59 (L) 140 - 450 10*3/mm3   POC Glucose Once    Collection Time: 09/14/24  7:14 AM    Specimen: Blood   Result Value Ref Range    Glucose 87 70 - 130 mg/dL   POC Glucose Once    Collection Time: 09/14/24 11:18 AM    Specimen: Blood   Result Value Ref Range    Glucose 106 70 - 130 mg/dL       Radiology:  Imaging Results (Last 24 Hours)       ** No results found for the last 24 hours. **               Medications have been reviewed:  Current Facility-Administered Medications   Medication Dose Route Frequency Provider Last Rate Last Admin    acetaminophen (TYLENOL) tablet 650 mg  650 mg Oral Q4H PRN Dimitrios Gunter MD   650 mg at 09/13/24 0123    Or    acetaminophen (TYLENOL) 160 MG/5ML oral solution 650 mg  650 mg Oral Q4H PRN Dimitrios Gunter MD        Or    acetaminophen (TYLENOL) suppository 650 mg  650 mg Rectal Q4H PRN Dimitrios Gunter MD        sennosides-docusate (PERICOLACE) 8.6-50 MG per tablet 2 tablet  2 tablet Oral BID Dimitrios Gunter MD   2 tablet at 09/14/24 0818    And    polyethylene glycol (MIRALAX) packet 17 g  17 g Oral Daily PRN Dimitrios Gunter MD        And    bisacodyl (DULCOLAX) EC tablet 5 mg  5 mg Oral Daily PRN Dimitrios Gunter,  MD        And    bisacodyl (DULCOLAX) suppository 10 mg  10 mg Rectal Daily PRN Dimitrios Gunter MD        Calcium Replacement - Follow Nurse / BPA Driven Protocol   Does not apply Dimitrios Aponte MD        cyanocobalamin injection 1,000 mcg  1,000 mcg Intramuscular Daily Dimitrios Gunter MD   1,000 mcg at 09/14/24 0818    dextrose (D50W) (25 g/50 mL) IV injection 25 g  25 g Intravenous Q15 Min PRN Dimitrios Gunter MD        dextrose (GLUTOSE) oral gel 15 g  15 g Oral Q15 Min PRDimitrios Etienne MD        epoetin ted-epbx (RETACRIT) injection 10,000 Units  10,000 Units Intravenous Once per day on Monday Wednesday Friday Ki Gallardo MD   10,000 Units at 09/13/24 1347    famotidine (PEPCID) tablet 10 mg  10 mg Oral BID PRN Dimitrios Gunter MD        folic acid (FOLVITE) tablet 1 mg  1 mg Oral Daily Dimitrios Gunter MD   1 mg at 09/14/24 0818    gabapentin (NEURONTIN) capsule 100 mg  100 mg Oral Nightly Dimitrios Gunter MD   100 mg at 09/13/24 2007    glucagon (GLUCAGEN) injection 1 mg  1 mg Intramuscular Q15 Min PRDimitrios Etienne MD        HYDROcodone-acetaminophen (NORCO) 5-325 MG per tablet 1 tablet  1 tablet Oral Q6H PRN Dimitrios Gunter MD   1 tablet at 09/14/24 0209    insulin lispro (HUMALOG/ADMELOG) injection 2-7 Units  2-7 Units Subcutaneous 4x Daily AC & at Bedtime Dimitrios Gunter MD        loperamide (IMODIUM) capsule 2 mg  2 mg Oral 4x Daily PRN Dimitrios Gunter MD        Magnesium Standard Dose Replacement - Follow Nurse / BPA Driven Protocol   Does not apply Dimitrios Aponte MD        melatonin tablet 2.5 mg  2.5 mg Oral Nightly Dimitrios Gunter MD   2.5 mg at 09/13/24 2007    ondansetron ODT (ZOFRAN-ODT) disintegrating tablet 4 mg  4 mg Oral Q6H PRN Dimitrios Gunter MD        Or    ondansetron (ZOFRAN) injection 4 mg  4 mg Intravenous Q6H PRN Dimitrios Gunter  MD Juan Diego        Phosphorus Replacement - Follow Nurse / BPA Driven Protocol   Does not apply PRDimitrios Etienne MD        Potassium Replacement - Follow Nurse / BPA Driven Protocol   Does not apply Dimitrios Aponte MD        sevelamer (RENVELA) tablet 2,400 mg  2,400 mg Oral TID With Meals Ki Gallardo MD   2,400 mg at 09/14/24 0818    sodium chloride 0.9 % flush 3 mL  3 mL Intravenous Q12H Dimitrios Gunter MD   3 mL at 09/13/24 2008    sodium chloride 0.9 % flush 3-10 mL  3-10 mL Intravenous PRN Dimitrios Gunter MD        sodium chloride 0.9 % infusion 40 mL  40 mL Intravenous PRN Dimitrios Gunter MD           ASSESSMENT:    ESRD.  Normally on dialysis 2 days a week but his baseline GFR is only 3 he has severe acidosis and had hyperkalemia yesterday.  He would likely benefit from standard, 3 dialysis treatments per week  Severe acidosis with high gap  Hyperkalemia  Anemia of CKD  Severe hyperphosphatemia    Frequent falls    Bilateral leg numbness, acute on chronic    General weakness    History of alcohol dependence, reports 5-year sobriety    ESRD (end stage renal disease) on dialysis    History of vitamin B12 deficiency    Neuropathy    Macrocytic anemia    Metabolic acidosis    Hyperkalemia    Thrombocytopenia           DISCUSSION/PLAN:     Hemodialysis 3 days per week   With minimal UF as bp allows     Epogen with HD for anemia.  Transfuse prn HgB <7   Continue on phosphorous binders  Low Po4 diet   Dose all medications for GFR <20  Monitor electrolytes and volume closely   Crystal Gallardo MD   [facility]   Office phone number: [phone]  Answering service phone number: [phone]    09/14/24  12:24 EDT

## 2024-09-14 NOTE — PLAN OF CARE
Goal Outcome Evaluation:  Plan of Care Reviewed With: patient           Outcome Evaluation: Pt. admitted to [facility] with ERSD, frequent falls, low back pain. Pt. lives with  sibling-sister and was indep. with self care at home per pt. Pt. presents with generalized weakness, impaired balance and decreased ADL performance. LE dressing SBA seated EOB. Grooming indep. after set up.  CGA sit to stand and transferred to chair with Rwx CGA. Functional endurance fair. Pt. may benefit from OT to maximize indep. with self care, safety functional transfers due to hx falls. Anticipate discharge to home with HH or rehab depending on progress. Pt. has no AD at home per pt.

## 2024-09-14 NOTE — NURSING NOTE
No new issues overnight. Norco PRN. HD completed without issue and patient tolerated full duration. VSS. Aox4. Frequently using bathoom with small amount of diarrhea. Possibly home today.

## 2024-09-14 NOTE — PLAN OF CARE
Goal Outcome Evaluation:  Plan of Care Reviewed With: patient           Outcome Evaluation: Pt. agreeable to PT this date though declines ambulation out of room 2' fatigue. Performed seated LAQ, heel raises, toe raises, marches x10 B LE; standing marches and heel raises x10 each; STS x2. Noted knee buckling with standing ther ex and discussed importance of use of rwx upon D/C for safety due to falls risk. Pt. reports he does have a walker at home. Dicussed potential benefit from D/C to rehab due to LE weakness, falls risk, and improve safety awareness though pt. verbalizes desire to D/C home.      Anticipated Discharge Disposition (PT): skilled nursing facility, inpatient rehabilitation facility

## 2024-09-15 NOTE — CASE MANAGEMENT/SOCIAL WORK
Case Management Discharge Note      Final Note: home         Selected Continued Care - Discharged on 9/14/2024 Admission date: 9/12/2024 - Discharge disposition: Home or Self Care      Destination    No services have been selected for the patient.                Durable Medical Equipment    No services have been selected for the patient.                Dialysis/Infusion    No services have been selected for the patient.                Home Medical Care    No services have been selected for the patient.                Therapy    No services have been selected for the patient.                Community Resources    No services have been selected for the patient.                Community & DME    No services have been selected for the patient.                    Transportation Services  Taxi: [employer]    Final Discharge Disposition Code: 01 - home or self-care

## 2024-09-25 ENCOUNTER — HOSPITAL ENCOUNTER (EMERGENCY)
Facility: HOSPITAL | Age: 59
Discharge: HOME | End: 2024-09-25
Attending: EMERGENCY MEDICINE | Admitting: EMERGENCY MEDICINE
Payer: COMMERCIAL

## 2024-09-25 VITALS
BODY MASS INDEX: 20.3 KG/M2 | HEIGHT: 71 IN | SYSTOLIC BLOOD PRESSURE: 171 MMHG | WEIGHT: 145 LBS | TEMPERATURE: 97.2 F | OXYGEN SATURATION: 100 % | HEART RATE: 88 BPM | DIASTOLIC BLOOD PRESSURE: 98 MMHG | RESPIRATION RATE: 18 BRPM

## 2024-09-25 DIAGNOSIS — M79.601 RIGHT ARM PAIN: Primary | ICD-10-CM

## 2024-09-25 PROCEDURE — 99283 EMERGENCY DEPT VISIT LOW MDM: CPT

## 2024-09-25 RX ORDER — HYDROCODONE BITARTRATE AND ACETAMINOPHEN 5; 325 MG/1; MG/1
1 TABLET ORAL EVERY 12 HOURS PRN
Qty: 6 TABLET | Refills: 0 | Status: SHIPPED | OUTPATIENT
Start: 2024-09-25

## 2024-09-25 NOTE — ED TRIAGE NOTES
Pt was brought in by ems for right shoulder pain that started after getting B12 injection 3wks ago.

## 2024-09-25 NOTE — ED PROVIDER NOTES
EMERGENCY DEPARTMENT ENCOUNTER    Room Number:  23/23  Date of encounter:  9/26/2024  PCP: Zahida Sanchez APRN  Historian: Patient  Relevant information and history provided by sources other than the patient will be included below and in the ED Course.  Review of pertinent past medical records may also be included in record below and ED Course.    HPI:  Chief Complaint: Right arm pain  A complete HPI/ROS/PMH/PSH/SH/FH are unobtainable due to: Not applicable  Context: Viet Guerra Jr. is a 58 y.o. male who presents to the ED c/o this gentleman since he had 2 injections of B12 in his right upper extremity he has had pain there.  He was seen at [facility] on the 20th they did an x-ray.  There is an orthopedic surgeon that they called that he is name is Dr. Sanchez but is unable to get into see that orthopedic surgeon until the first part of October.  He was given pain medicine and has ran out.  This pain is worse with any sort of movement especially when he abducts that arm or moves that arm across the his body.  Initially had some bruising to that arm and he states some swelling.  He is a chronically ill man with significant comorbidities.  He is chronically malnourished he has renal failure and is on dialysis and is a longstanding diabetic.  He reports no fevers or chills or coughs or colds.  Denies any other new complaint.  He has chronic diabetic neuropathy which is at baseline.        Previous Episodes: Yes this has been occurring for 3 weeks  Current Symptoms: See above    MEDICAL HISTORY REVIEWED  I can see this patient was seen at [facility] on 9/20/2024 with bruising of the right upper arm after she received 2 injections of vitamin B12 according to their evaluation the pain was worsened with movement.  This gentleman has chronic renal failure and is on dialysis.  He has chronic diabetes he has had a history of alcohol dependence in the past but is currently sober.  He has chronic  neuropathy to his lower extremities they did an x-ray of the humerus which showed no acute radiographic abnormality and I reviewed that report and the note from [facility] and the radiologist note.      PAST MEDICAL HISTORY  Active Ambulatory Problems     Diagnosis Date Noted    Frequent falls 09/12/2024    Bilateral leg numbness, acute on chronic 09/13/2024    General weakness 09/13/2024    History of alcohol dependence, reports 5-year sobriety 09/13/2024    ESRD (end stage renal disease) on dialysis     B12 deficiency 09/13/2024    Neuropathy 09/13/2024    Macrocytic anemia 09/13/2024    Metabolic acidosis 09/13/2024    Hyperkalemia 09/13/2024    Thrombocytopenia 09/13/2024     Resolved Ambulatory Problems     Diagnosis Date Noted    No Resolved Ambulatory Problems     Past Medical History:   Diagnosis Date    Diabetes mellitus     Renal disorder          PAST SURGICAL HISTORY  History reviewed. No pertinent surgical history.      FAMILY HISTORY  History reviewed. No pertinent family history.      SOCIAL HISTORY  Social History     Socioeconomic History    Marital status: Single   Tobacco Use    Smoking status: Former     Types: Cigarettes    Smokeless tobacco: Former   Vaping Use    Vaping status: Never Used    Passive vaping exposure: Yes   Substance and Sexual Activity    Alcohol use: Defer    Drug use: Not Currently    Sexual activity: Defer         ALLERGIES  Ibuprofen        REVIEW OF SYSTEMS  Review of Systems     All systems reviewed and negative except for those discussed in HPI.       PHYSICAL EXAM    I have reviewed the triage vital signs and nursing notes.    ED Triage Vitals [09/25/24 1911]   Temp Heart Rate Resp BP SpO2   97.2 °F (36.2 °C) 88 18 171/98 100 %      Temp src Heart Rate Source Patient Position BP Location FiO2 (%)   -- Monitor -- -- --       GENERAL: This is a male that looks much older than his stated age.  He appears chronically ill and frail.  Appears chronically malnourished no acute  distress.Vital signs on my initial evaluation have been reviewed and unremarkable  HENT: nares patent  Head/neck/ face are symmetric without gross deformity, signs of trauma, or swelling  EYES: no scleral icterus, no conjunctival pallor.  NECK: Supple, no meningismus  CV: regular rhythm, regular rate with intact distal pulses.  Soft systolic murmur 2 out of 6  RESPIRATORY: normal effort and no respiratory distress.  Clear to auscultation bilaterally  ABDOMEN: soft and nontender.  MUSCULOSKELETAL: To his left upper extremity he has a dialysis catheter with good thrill.  To his affected extremity which is his right upper extremity he has a tattoo of Jameson Bran.  Right at the face of Jameson Bran is his location of pain.  This is below his shoulder and is below the surgical neck.  Is the lateral portion of his humerus.  There is no obvious swelling or bruising erythema or warmth noted.  His pain is reproducible when he abducts that arm or he reaches that arm across the body or I palpate locally in that region.  He has no pain in palpation of the clavicle.  There is no warmth.  He is neurovascularly intact.  Has good strong intact distal pulses to his upper extremities bilaterally he has no coolness or cyanosis or pallor.  He has no new motor or sensory deficit.  NEURO: alert and appropriate, moves all extremities, follows commands.  No new motor or sensory deficit.  No focal motor or sensory changes.  He has his chronic diabetic neuropathy  SKIN: warm, dry    Vital signs and nursing notes reviewed.        LAB RESULTS  No results found for this or any previous visit (from the past 24 hour(s)).    Ordered the above labs and independently reviewed the results.        RADIOLOGY  No Radiology Exams Resulted Within Past 24 Hours    I ordered the above noted radiological studies. Reviewed by me and discussed with radiologist.  See dictation for official radiology  interpretation.      PROCEDURES    Procedures      MEDICATIONS GIVEN IN ER    Medications - No data to display      All labs have been independently reviewed by me.  All radiology studies have been reviewed by me and I discussed with radiologist dictating the report when indicated below.  All EKG's independently viewed and interpreted by me.  Discussion below represents my analysis of pertinent findings related to patient's condition, differential diagnosis, treatment plan and final disposition.        PROGRESS, DATA ANALYSIS, CONSULTS, AND MEDICAL DECISION MAKING    This is a gentleman that has pain in his right upper extremity and his proximal humerus.  He just had an x-ray and I reviewed that report on 920 at [facility].  There is no point in repeating an x-ray.  I do not believe this gentleman has an acute infection there is no erythema there is no warmth there is no swelling.  I will give him an orthopedic doctor to see if he can get in to 1 sooner as that is his wish.  He does have an orthopedic doctor set up for the early part of October.  I will give him a couple doses of pain medicine.  I am going avoid nonsteroidals because he is on dialysis chronic renal failure.  All questions answered at this time.  At rest this gentleman does not have pain.  He would like a splint to help alleviate his pain and keep it in a neutral position.           AS OF 02:38 EDT VITALS:    BP - 171/98  HR - 88  TEMP - 97.2 °F (36.2 °C)  02 SATS - 100%    SOCIAL DETERMINANTS OF HEALTH THAT IMPACT OR LIMIT CARE (For example..Homelessness,safe discharge, inability to obtain care, follow up, or prescriptions):      DIAGNOSIS  Final diagnoses:   Right arm pain: Localized pain to proximal humerus after IM injection 3 weeks ago         DISPOSITION  DISCHARGE    Patient discharged in stable condition.    Reviewed implications of results, diagnosis, meds, responsibility to follow up, warning signs and symptoms of possible worsening,  potential complications and reasons to return to ER, including worsening of symptoms, any fever, rash, worsening of condition, any concerns.    Patient/Family voiced understanding of above instructions.    Discussed plan for discharge, as there is no emergent indication for admission. Pt/family is agreeable and understands need for follow up and repeat testing.  Pt is aware that discharge does not mean that nothing is wrong but it indicates no emergency is present that requires admission and they must continue care with follow-up as given below or physician of their choice.     FOLLOW-UP  Jhony Hadley II, MD  [address] [facility] [address]  [place] [address]  [phone]      Call tomorrow morning to see if you can arrange follow-up in the next week.  Return if you have any fever, new swelling or rash or any other concerns., Return if pain worsens, shortness of breath, fever, any concerns         Medication List        New Prescriptions      HYDROcodone-acetaminophen 5-325 MG per tablet  Commonly known as: NORCO  Take 1 tablet by mouth Every 12 (Twelve) Hours As Needed for Moderate Pain for up to 6 doses.               Where to Get Your Medications        You can get these medications from any pharmacy    Bring a paper prescription for each of these medications  HYDROcodone-acetaminophen 5-325 MG per tablet       Worsening of pain, fever, rash, any concerns.          DICTATED UTILIZING DRAGON DICTATION    Note Disclaimer: At [facility], we believe that sharing information builds trust and better relationships. You are receiving this note because you recently visited [facility]. It is possible you will see health information before a provider has talked with you about it. This kind of information can be easy to misunderstand. To help you fully understand what it means for your health, we urge you to discuss this note with your provider.       Scooter Ware MD  09/26/24 9203

## 2024-09-26 NOTE — DISCHARGE INSTRUCTIONS
As we discussed you can take Tylenol for pain.  You can use the Lortab for breakthrough pain.  Follow-up with orthopedic surgeon.

## 2024-10-11 ENCOUNTER — HOSPITAL ENCOUNTER (EMERGENCY)
Facility: HOSPITAL | Age: 59
Discharge: HOME | End: 2024-10-11
Attending: EMERGENCY MEDICINE
Payer: COMMERCIAL

## 2024-10-11 VITALS
DIASTOLIC BLOOD PRESSURE: 89 MMHG | TEMPERATURE: 96.8 F | RESPIRATION RATE: 18 BRPM | HEART RATE: 86 BPM | SYSTOLIC BLOOD PRESSURE: 147 MMHG | WEIGHT: 145.06 LBS | BODY MASS INDEX: 20.31 KG/M2 | HEIGHT: 71 IN | OXYGEN SATURATION: 98 %

## 2024-10-11 DIAGNOSIS — E87.6 HYPOKALEMIA: ICD-10-CM

## 2024-10-11 DIAGNOSIS — M79.2 NEUROPATHIC PAIN: Primary | ICD-10-CM

## 2024-10-11 DIAGNOSIS — N18.6 ESRD ON DIALYSIS: ICD-10-CM

## 2024-10-11 DIAGNOSIS — Z99.2 ESRD ON DIALYSIS: ICD-10-CM

## 2024-10-11 LAB
ALBUMIN SERPL-MCNC: 3 G/DL (ref 3.5–5.2)
ALBUMIN/GLOB SERPL: 1.2 G/DL
ALP SERPL-CCNC: 133 U/L (ref 39–117)
ALT SERPL W P-5'-P-CCNC: 8 U/L (ref 1–41)
ANION GAP SERPL CALCULATED.3IONS-SCNC: 16.3 MMOL/L (ref 5–15)
AST SERPL-CCNC: 7 U/L (ref 1–40)
BASOPHILS # BLD AUTO: 0.04 10*3/MM3 (ref 0–0.2)
BASOPHILS NFR BLD AUTO: 0.7 % (ref 0–1.5)
BILIRUB SERPL-MCNC: 0.2 MG/DL (ref 0–1.2)
BUN SERPL-MCNC: 27 MG/DL (ref 6–20)
BUN/CREAT SERPL: 2 (ref 7–25)
CALCIUM SPEC-SCNC: 7.5 MG/DL (ref 8.6–10.5)
CHLORIDE SERPL-SCNC: 104 MMOL/L (ref 98–107)
CO2 SERPL-SCNC: 17.7 MMOL/L (ref 22–29)
CREAT SERPL-MCNC: 13.24 MG/DL (ref 0.76–1.27)
DEPRECATED RDW RBC AUTO: 60.1 FL (ref 37–54)
EGFRCR SERPLBLD CKD-EPI 2021: 4 ML/MIN/1.73
EOSINOPHIL # BLD AUTO: 0.18 10*3/MM3 (ref 0–0.4)
EOSINOPHIL NFR BLD AUTO: 3.2 % (ref 0.3–6.2)
ERYTHROCYTE [DISTWIDTH] IN BLOOD BY AUTOMATED COUNT: 17.2 % (ref 12.3–15.4)
GLOBULIN UR ELPH-MCNC: 2.6 GM/DL
GLUCOSE SERPL-MCNC: 83 MG/DL (ref 65–99)
HCT VFR BLD AUTO: 33.8 % (ref 37.5–51)
HGB BLD-MCNC: 10.9 G/DL (ref 13–17.7)
IMM GRANULOCYTES # BLD AUTO: 0.02 10*3/MM3 (ref 0–0.05)
IMM GRANULOCYTES NFR BLD AUTO: 0.4 % (ref 0–0.5)
LYMPHOCYTES # BLD AUTO: 0.66 10*3/MM3 (ref 0.7–3.1)
LYMPHOCYTES NFR BLD AUTO: 11.6 % (ref 19.6–45.3)
MAGNESIUM SERPL-MCNC: 1.7 MG/DL (ref 1.6–2.6)
MCH RBC QN AUTO: 31.1 PG (ref 26.6–33)
MCHC RBC AUTO-ENTMCNC: 32.2 G/DL (ref 31.5–35.7)
MCV RBC AUTO: 96.3 FL (ref 79–97)
MONOCYTES # BLD AUTO: 0.54 10*3/MM3 (ref 0.1–0.9)
MONOCYTES NFR BLD AUTO: 9.5 % (ref 5–12)
NEUTROPHILS NFR BLD AUTO: 4.27 10*3/MM3 (ref 1.7–7)
NEUTROPHILS NFR BLD AUTO: 74.6 % (ref 42.7–76)
NRBC BLD AUTO-RTO: 0 /100 WBC (ref 0–0.2)
PLATELET # BLD AUTO: 113 10*3/MM3 (ref 140–450)
PMV BLD AUTO: 10.4 FL (ref 6–12)
POTASSIUM SERPL-SCNC: 2.8 MMOL/L (ref 3.5–5.2)
PROT SERPL-MCNC: 5.6 G/DL (ref 6–8.5)
RBC # BLD AUTO: 3.51 10*6/MM3 (ref 4.14–5.8)
SODIUM SERPL-SCNC: 138 MMOL/L (ref 136–145)
WBC NRBC COR # BLD AUTO: 5.71 10*3/MM3 (ref 3.4–10.8)

## 2024-10-11 PROCEDURE — 80053 COMPREHEN METABOLIC PANEL: CPT | Performed by: EMERGENCY MEDICINE

## 2024-10-11 PROCEDURE — 99283 EMERGENCY DEPT VISIT LOW MDM: CPT

## 2024-10-11 PROCEDURE — 36415 COLL VENOUS BLD VENIPUNCTURE: CPT

## 2024-10-11 PROCEDURE — 83735 ASSAY OF MAGNESIUM: CPT | Performed by: PHYSICIAN ASSISTANT

## 2024-10-11 PROCEDURE — 85025 COMPLETE CBC W/AUTO DIFF WBC: CPT | Performed by: EMERGENCY MEDICINE

## 2024-10-11 RX ORDER — POTASSIUM CHLORIDE 750 MG/1
40 TABLET, FILM COATED, EXTENDED RELEASE ORAL ONCE
Status: COMPLETED | OUTPATIENT
Start: 2024-10-11 | End: 2024-10-11

## 2024-10-11 RX ADMIN — POTASSIUM CHLORIDE 40 MEQ: 750 TABLET, EXTENDED RELEASE ORAL at 19:27

## 2024-10-11 NOTE — ED NOTES
Patient arrives via [facility] for complaints of bilateral foot pain that has been going for a month. Patient has an appointment with his PCP on 10/17. History of frequent falls due to pain. Alert and oriented x4.

## 2024-10-11 NOTE — ED PROVIDER NOTES
EMERGENCY DEPARTMENT ENCOUNTER  Room Number:  17/17  PCP: Zahida Sanchez APRN  Independent Historians: Patient and EMS      HPI:  Chief Complaint: had concerns including Foot Pain.     A complete HPI/ROS/PMH/PSH/SH/FH are unobtainable due to: None    Chronic or social conditions impacting patient care (Social Determinants of Health): None      Context: Viet Guerra Jr. is a 59 y.o. male with a medical history of ESRD on hemodialysis, frequent falls, bilateral lower extremity neuropathy, and prior alcohol dependence who presents to the ED c/o acute bilateral foot pain.  Patient reports he has chronic bilateral foot pain but it has gotten worse over the last 3 days.  He has been taking Tylenol which does not help with the pain.  Reports he was recently admitted to the hospital and physical therapy recommended skilled nursing facility.  However, patient reports he could not stay because he had a cat at home.  He reports he has arranged care for his cat now and he would be able to go to a skilled nursing facility.  Reports he received full dialysis today.  He recently underwent thrombectomy and declotting of his fistula.  Patient denies calf or thigh pain.  No other systemic complaints at this time.      Review of prior external notes (non-ED) -and- Review of prior external test results outside of this encounter:  Patient seen in office by vascular surgery on 7/27/2023 for end-stage renal disease.  Reviewed assessment and plan.  Patient will follow-up in 1 month.  Reviewed labs collected on 9/14/2024.  CBC with hemoglobin 9.1, renal function panel with creatinine 8.23.    Prescription drug monitoring program review:     N/A    PAST MEDICAL HISTORY  Active Ambulatory Problems     Diagnosis Date Noted    Frequent falls 09/12/2024    Bilateral leg numbness, acute on chronic 09/13/2024    General weakness 09/13/2024    History of alcohol dependence, reports 5-year sobriety 09/13/2024    ESRD (end stage renal  disease) on dialysis     B12 deficiency 09/13/2024    Neuropathy 09/13/2024    Macrocytic anemia 09/13/2024    Metabolic acidosis 09/13/2024    Hyperkalemia 09/13/2024    Thrombocytopenia 09/13/2024     Resolved Ambulatory Problems     Diagnosis Date Noted    No Resolved Ambulatory Problems     Past Medical History:   Diagnosis Date    Diabetes mellitus     Renal disorder          PAST SURGICAL HISTORY  No past surgical history on file.      FAMILY HISTORY  No family history on file.      SOCIAL HISTORY  Social History     Socioeconomic History    Marital status: Single   Tobacco Use    Smoking status: Former     Types: Cigarettes    Smokeless tobacco: Former   Vaping Use    Vaping status: Never Used    Passive vaping exposure: Yes   Substance and Sexual Activity    Alcohol use: Defer    Drug use: Not Currently    Sexual activity: Defer         ALLERGIES  Ibuprofen      REVIEW OF SYSTEMS  Review of Systems   Constitutional:  Negative for chills and fever.   HENT:  Negative for ear pain and sore throat.    Respiratory:  Negative for cough and shortness of breath.    Cardiovascular:  Negative for chest pain and palpitations.   Gastrointestinal:  Negative for abdominal pain and vomiting.   Genitourinary:  Negative for dysuria and hematuria.   Musculoskeletal:  Positive for arthralgias. Negative for joint swelling.   Skin:  Negative for pallor and rash.   Neurological:  Negative for syncope and headaches.   Psychiatric/Behavioral:  Negative for confusion and hallucinations.      Included in HPI  All systems reviewed and negative except for those discussed in HPI.      PHYSICAL EXAM    I have reviewed the triage vital signs and nursing notes.    ED Triage Vitals [10/11/24 1752]   Temp Heart Rate Resp BP SpO2   96.8 °F (36 °C) 90 14 132/88 98 %      Temp src Heart Rate Source Patient Position BP Location FiO2 (%)   -- -- -- -- --       Physical Exam  Constitutional:       General: He is not in acute distress.      Appearance: Normal appearance.   HENT:      Head: Normocephalic and atraumatic.      Nose: Nose normal.      Mouth/Throat:      Mouth: Mucous membranes are moist.   Eyes:      Conjunctiva/sclera: Conjunctivae normal.      Pupils: Pupils are equal, round, and reactive to light.   Cardiovascular:      Rate and Rhythm: Normal rate and regular rhythm.      Pulses: Normal pulses.      Heart sounds: Normal heart sounds.      Comments: LUE fistula with palpable thrill  Pulmonary:      Effort: Pulmonary effort is normal.      Breath sounds: Normal breath sounds.   Abdominal:      General: There is no distension.   Musculoskeletal:         General: Normal range of motion.      Cervical back: Normal range of motion and neck supple.   Feet:      Comments: No focal tenderness over bilateral feet.  2+ DP pulse bilaterally.  Good cap refill bilaterally.  Skin:     General: Skin is warm.      Capillary Refill: Capillary refill takes less than 2 seconds.   Neurological:      General: No focal deficit present.      Mental Status: He is alert and oriented to person, place, and time.   Psychiatric:         Mood and Affect: Mood normal.           LAB RESULTS  Recent Results (from the past 24 hour(s))   Comprehensive Metabolic Panel    Collection Time: 10/11/24  6:17 PM    Specimen: Arm, Right; Blood   Result Value Ref Range    Glucose 83 65 - 99 mg/dL    BUN 27 (H) 6 - 20 mg/dL    Creatinine 13.24 (H) 0.76 - 1.27 mg/dL    Sodium 138 136 - 145 mmol/L    Potassium 2.8 (L) 3.5 - 5.2 mmol/L    Chloride 104 98 - 107 mmol/L    CO2 17.7 (L) 22.0 - 29.0 mmol/L    Calcium 7.5 (L) 8.6 - 10.5 mg/dL    Total Protein 5.6 (L) 6.0 - 8.5 g/dL    Albumin 3.0 (L) 3.5 - 5.2 g/dL    ALT (SGPT) 8 1 - 41 U/L    AST (SGOT) 7 1 - 40 U/L    Alkaline Phosphatase 133 (H) 39 - 117 U/L    Total Bilirubin 0.2 0.0 - 1.2 mg/dL    Globulin 2.6 gm/dL    A/G Ratio 1.2 g/dL    BUN/Creatinine Ratio 2.0 (L) 7.0 - 25.0    Anion Gap 16.3 (H) 5.0 - 15.0 mmol/L    eGFR 4.0 (L)  >60.0 mL/min/1.73   CBC Auto Differential    Collection Time: 10/11/24  6:17 PM    Specimen: Arm, Right; Blood   Result Value Ref Range    WBC 5.71 3.40 - 10.80 10*3/mm3    RBC 3.51 (L) 4.14 - 5.80 10*6/mm3    Hemoglobin 10.9 (L) 13.0 - 17.7 g/dL    Hematocrit 33.8 (L) 37.5 - 51.0 %    MCV 96.3 79.0 - 97.0 fL    MCH 31.1 26.6 - 33.0 pg    MCHC 32.2 31.5 - 35.7 g/dL    RDW 17.2 (H) 12.3 - 15.4 %    RDW-SD 60.1 (H) 37.0 - 54.0 fl    MPV 10.4 6.0 - 12.0 fL    Platelets 113 (L) 140 - 450 10*3/mm3    Neutrophil % 74.6 42.7 - 76.0 %    Lymphocyte % 11.6 (L) 19.6 - 45.3 %    Monocyte % 9.5 5.0 - 12.0 %    Eosinophil % 3.2 0.3 - 6.2 %    Basophil % 0.7 0.0 - 1.5 %    Immature Grans % 0.4 0.0 - 0.5 %    Neutrophils, Absolute 4.27 1.70 - 7.00 10*3/mm3    Lymphocytes, Absolute 0.66 (L) 0.70 - 3.10 10*3/mm3    Monocytes, Absolute 0.54 0.10 - 0.90 10*3/mm3    Eosinophils, Absolute 0.18 0.00 - 0.40 10*3/mm3    Basophils, Absolute 0.04 0.00 - 0.20 10*3/mm3    Immature Grans, Absolute 0.02 0.00 - 0.05 10*3/mm3    nRBC 0.0 0.0 - 0.2 /100 WBC   Magnesium    Collection Time: 10/11/24  6:17 PM    Specimen: Arm, Right; Blood   Result Value Ref Range    Magnesium 1.7 1.6 - 2.6 mg/dL           MEDICATIONS GIVEN IN ER  Medications   potassium chloride (K-DUR,KLOR-CON) ER tablet 40 mEq (40 mEq Oral Given 10/11/24 1927)         ORDERS PLACED DURING THIS VISIT:  Orders Placed This Encounter   Procedures    Comprehensive Metabolic Panel    CBC Auto Differential    Magnesium    CBC & Differential         OUTPATIENT MEDICATION MANAGEMENT:  No current Epic-ordered facility-administered medications on file.     Current Outpatient Medications Ordered in Epic   Medication Sig Dispense Refill    folic acid (FOLVITE) 1 MG tablet Take 1 tablet by mouth Daily. 30 tablet 1    gabapentin (NEURONTIN) 100 MG capsule       HYDROcodone-acetaminophen (NORCO) 5-325 MG per tablet Take 1 tablet by mouth Every 12 (Twelve) Hours As Needed for Moderate Pain for up  to 6 doses. 6 tablet 0    sevelamer (RENVELA) 800 MG tablet Take 3 tablets by mouth 3 (Three) Times a Day With Meals. 270 tablet 1    vitamin B-12 (CYANOCOBALAMIN) 1000 MCG tablet Take 1 tablet by mouth Daily. 30 tablet 1           PROGRESS, DATA ANALYSIS, CONSULTS, AND MEDICAL DECISION MAKING  All labs have been independently interpreted by me.  All radiology studies have been reviewed by me. All EKG's have been independently viewed and interpreted by me.  Discussion below represents my analysis of pertinent findings related to patient's condition, differential diagnosis, treatment plan and final disposition.    Differential diagnosis includes but is not limited to hypokalemia, hypomagnesemia, neuropathy.        ED Course as of 10/11/24 2131   Fri Oct 11, 2024   1933 WBC: 5.71 [MP]   1935 Hemoglobin(!): 10.9 [MP]   1936 BUN(!): 27 [MP]   1936 Creatinine(!): 13.24 [MP]   1936 Potassium(!): 2.8 [MP]      ED Course User Index  [MP] Catalina Rangel PA-C             AS OF 21:31 EDT VITALS:    BP - 147/89  HR - 86  TEMP - 96.8 °F (36 °C)  O2 SATS - 98%    COMPLEXITY OF CARE  Admission was considered but after careful review of the patient's presentation, physical examination, diagnostic results, and response to treatment the patient may be safely discharged with outpatient follow-up.      DIAGNOSIS  Final diagnoses:   Neuropathic pain   ESRD on dialysis   Hypokalemia         DISPOSITION  ED Disposition       ED Disposition   Discharge    Condition   Stable    Comment   --                Please note that portions of this document were completed with a voice recognition program.    Note Disclaimer: At [facility], we believe that sharing information builds trust and better relationships. You are receiving this note because you recently visited [facility]. It is possible you will see health information before a provider has talked with you about it. This kind of information can be easy to misunderstand. To help  you fully understand what it means for your health, we urge you to discuss this note with your provider.         Catalina Rangel PA-C  10/11/24 1393

## 2024-10-12 NOTE — DISCHARGE INSTRUCTIONS
Continue current medications, close follow-up with PCP and dialysis as scheduled, ED return for worsening symptoms as needed.

## 2024-10-12 NOTE — ED PROVIDER NOTES
Pt presents to the ED c/o chronic bilateral feet pain.  Patient is a dialysis patient with dialysis today.  Denies any chest pain or shortness of breath.  Denies any trauma or injury.     On exam,   General: No acute distress, nontoxic  HEENT: EOMI  Pulm: Symmetric chest rise, nonlabored breathing  CV: Regular rate and rhythm  GI: Nondistended  MSK: No deformity, ambulating without difficulty  Skin: Warm, dry  Neuro: Awake, alert, oriented x 4, moving all extremities, no focal deficits  Psych: Calm, cooperative    Vital signs and nursing notes reviewed.           Plan: Plan to check labs and reevaluate.  Patient is hemodynamically controlled and in no acute distress.  Will reevaluate with results.    ED Course as of 10/11/24 2009   Fri Oct 11, 2024   1933 WBC: 5.71 [MP]   1935 Hemoglobin(!): 10.9 [MP]   1936 BUN(!): 27 [MP]   1936 Creatinine(!): 13.24 [MP]   1936 Potassium(!): 2.8 [MP]      ED Course User Index  [MP] Catalina Rangel PA-C       Nonemergent workup today, safe for outpatient follow-up with PCP and dialysis.  ED return for worsening symptoms as needed.     MD Attestation Note    SHARED VISIT: This visit was performed by BOTH a physician and an APC. The substantive portion of the medical decision making was performed by this attesting physician who made or approved the management plan and takes responsibility for patient management. All studies in the APC note (if performed) were independently interpreted by me.                   Luis Nicolas MD  10/12/24 0233

## 2024-10-21 ENCOUNTER — INPATIENT HOSPITAL (OUTPATIENT)
Age: 59
End: 2024-10-21
Payer: COMMERCIAL

## 2024-10-21 ENCOUNTER — INPATIENT HOSPITAL (OUTPATIENT)
Dept: URBAN - METROPOLITAN AREA HOSPITAL 107 | Facility: HOSPITAL | Age: 59
End: 2024-10-21
Payer: COMMERCIAL

## 2024-10-21 DIAGNOSIS — R10.9 UNSPECIFIED ABDOMINAL PAIN: ICD-10-CM

## 2024-10-21 DIAGNOSIS — D64.9 ANEMIA, UNSPECIFIED: ICD-10-CM

## 2024-10-21 PROCEDURE — 99222 1ST HOSP IP/OBS MODERATE 55: CPT | Performed by: INTERNAL MEDICINE

## 2024-10-22 ENCOUNTER — INPATIENT HOSPITAL (OUTPATIENT)
Age: 59
End: 2024-10-22
Payer: COMMERCIAL

## 2024-10-22 ENCOUNTER — INPATIENT HOSPITAL (OUTPATIENT)
Dept: URBAN - METROPOLITAN AREA HOSPITAL 107 | Facility: HOSPITAL | Age: 59
End: 2024-10-22
Payer: COMMERCIAL

## 2024-10-22 DIAGNOSIS — D64.9 ANEMIA, UNSPECIFIED: ICD-10-CM

## 2024-10-22 DIAGNOSIS — R19.7 DIARRHEA, UNSPECIFIED: ICD-10-CM

## 2024-10-22 DIAGNOSIS — R10.9 UNSPECIFIED ABDOMINAL PAIN: ICD-10-CM

## 2024-10-22 DIAGNOSIS — R93.3 ABNORMAL FINDINGS ON DIAGNOSTIC IMAGING OF OTHER PARTS OF DI: ICD-10-CM

## 2024-10-22 PROCEDURE — 99233 SBSQ HOSP IP/OBS HIGH 50: CPT | Performed by: PHYSICIAN ASSISTANT

## 2024-10-23 ENCOUNTER — INPATIENT HOSPITAL (OUTPATIENT)
Dept: URBAN - METROPOLITAN AREA HOSPITAL 107 | Facility: HOSPITAL | Age: 59
End: 2024-10-23
Payer: COMMERCIAL

## 2024-10-23 ENCOUNTER — INPATIENT HOSPITAL (OUTPATIENT)
Age: 59
End: 2024-10-23
Payer: COMMERCIAL

## 2024-10-23 DIAGNOSIS — D64.9 ANEMIA, UNSPECIFIED: ICD-10-CM

## 2024-10-23 DIAGNOSIS — R93.3 ABNORMAL FINDINGS ON DIAGNOSTIC IMAGING OF OTHER PARTS OF DI: ICD-10-CM

## 2024-10-23 DIAGNOSIS — R10.9 UNSPECIFIED ABDOMINAL PAIN: ICD-10-CM

## 2024-10-23 DIAGNOSIS — R19.7 DIARRHEA, UNSPECIFIED: ICD-10-CM

## 2024-10-23 PROCEDURE — 99233 SBSQ HOSP IP/OBS HIGH 50: CPT | Performed by: PHYSICIAN ASSISTANT

## 2024-10-24 ENCOUNTER — INPATIENT HOSPITAL (OUTPATIENT)
Age: 59
End: 2024-10-24
Payer: COMMERCIAL

## 2024-10-24 ENCOUNTER — INPATIENT HOSPITAL (OUTPATIENT)
Dept: URBAN - METROPOLITAN AREA HOSPITAL 107 | Facility: HOSPITAL | Age: 59
End: 2024-10-24
Payer: COMMERCIAL

## 2024-10-24 DIAGNOSIS — R93.3 ABNORMAL FINDINGS ON DIAGNOSTIC IMAGING OF OTHER PARTS OF DI: ICD-10-CM

## 2024-10-24 DIAGNOSIS — D64.9 ANEMIA, UNSPECIFIED: ICD-10-CM

## 2024-10-24 DIAGNOSIS — R19.7 DIARRHEA, UNSPECIFIED: ICD-10-CM

## 2024-10-24 DIAGNOSIS — R10.9 UNSPECIFIED ABDOMINAL PAIN: ICD-10-CM

## 2024-10-24 PROCEDURE — 99232 SBSQ HOSP IP/OBS MODERATE 35: CPT | Performed by: PHYSICIAN ASSISTANT

## 2024-12-07 ENCOUNTER — INPATIENT HOSPITAL (OUTPATIENT)
Dept: URBAN - METROPOLITAN AREA HOSPITAL 107 | Facility: HOSPITAL | Age: 59
End: 2024-12-07
Payer: COMMERCIAL

## 2024-12-07 DIAGNOSIS — R93.3 ABNORMAL FINDINGS ON DIAGNOSTIC IMAGING OF OTHER PARTS OF DI: ICD-10-CM

## 2024-12-07 DIAGNOSIS — R19.7 DIARRHEA, UNSPECIFIED: ICD-10-CM

## 2024-12-07 PROCEDURE — 99222 1ST HOSP IP/OBS MODERATE 55: CPT | Performed by: INTERNAL MEDICINE

## 2024-12-08 PROCEDURE — 99232 SBSQ HOSP IP/OBS MODERATE 35: CPT | Performed by: INTERNAL MEDICINE

## 2024-12-09 ENCOUNTER — INPATIENT HOSPITAL (OUTPATIENT)
Dept: URBAN - METROPOLITAN AREA HOSPITAL 107 | Facility: HOSPITAL | Age: 59
End: 2024-12-09
Payer: COMMERCIAL

## 2024-12-09 DIAGNOSIS — R19.7 DIARRHEA, UNSPECIFIED: ICD-10-CM

## 2024-12-09 PROCEDURE — 99233 SBSQ HOSP IP/OBS HIGH 50: CPT | Performed by: PHYSICIAN ASSISTANT

## 2024-12-10 ENCOUNTER — INPATIENT HOSPITAL (OUTPATIENT)
Dept: URBAN - METROPOLITAN AREA HOSPITAL 107 | Facility: HOSPITAL | Age: 59
End: 2024-12-10
Payer: COMMERCIAL

## 2024-12-10 DIAGNOSIS — Z90.49 ACQUIRED ABSENCE OF OTHER SPECIFIED PARTS OF DIGESTIVE TRACT: ICD-10-CM

## 2024-12-10 DIAGNOSIS — K63.9 DISEASE OF INTESTINE, UNSPECIFIED: ICD-10-CM

## 2024-12-10 DIAGNOSIS — R19.7 DIARRHEA, UNSPECIFIED: ICD-10-CM

## 2024-12-10 DIAGNOSIS — R10.9 UNSPECIFIED ABDOMINAL PAIN: ICD-10-CM

## 2024-12-10 DIAGNOSIS — R93.3 ABNORMAL FINDINGS ON DIAGNOSTIC IMAGING OF OTHER PARTS OF DI: ICD-10-CM

## 2024-12-10 PROCEDURE — 99232 SBSQ HOSP IP/OBS MODERATE 35: CPT | Performed by: PHYSICIAN ASSISTANT

## 2024-12-18 ENCOUNTER — INPATIENT HOSPITAL (OUTPATIENT)
Dept: URBAN - METROPOLITAN AREA HOSPITAL 107 | Facility: HOSPITAL | Age: 59
End: 2024-12-18
Payer: COMMERCIAL

## 2024-12-18 DIAGNOSIS — R19.4 CHANGE IN BOWEL HABIT: ICD-10-CM

## 2024-12-18 DIAGNOSIS — R10.9 UNSPECIFIED ABDOMINAL PAIN: ICD-10-CM

## 2024-12-18 PROCEDURE — 99222 1ST HOSP IP/OBS MODERATE 55: CPT | Performed by: INTERNAL MEDICINE

## 2024-12-19 ENCOUNTER — INPATIENT HOSPITAL (OUTPATIENT)
Dept: URBAN - METROPOLITAN AREA HOSPITAL 107 | Facility: HOSPITAL | Age: 59
End: 2024-12-19
Payer: COMMERCIAL

## 2024-12-19 DIAGNOSIS — R19.4 CHANGE IN BOWEL HABIT: ICD-10-CM

## 2024-12-19 DIAGNOSIS — R10.9 UNSPECIFIED ABDOMINAL PAIN: ICD-10-CM

## 2024-12-19 PROCEDURE — 99233 SBSQ HOSP IP/OBS HIGH 50: CPT | Performed by: PHYSICIAN ASSISTANT

## 2025-01-18 ENCOUNTER — INPATIENT HOSPITAL (OUTPATIENT)
Dept: URBAN - METROPOLITAN AREA HOSPITAL 107 | Facility: HOSPITAL | Age: 60
End: 2025-01-18
Payer: COMMERCIAL

## 2025-01-18 DIAGNOSIS — D50.9 IRON DEFICIENCY ANEMIA, UNSPECIFIED: ICD-10-CM

## 2025-01-18 PROCEDURE — 99222 1ST HOSP IP/OBS MODERATE 55: CPT | Performed by: INTERNAL MEDICINE

## 2025-01-19 ENCOUNTER — INPATIENT HOSPITAL (OUTPATIENT)
Dept: URBAN - METROPOLITAN AREA HOSPITAL 107 | Facility: HOSPITAL | Age: 60
End: 2025-01-19
Payer: COMMERCIAL

## 2025-01-19 DIAGNOSIS — D64.9 ANEMIA, UNSPECIFIED: ICD-10-CM

## 2025-01-19 PROCEDURE — 99233 SBSQ HOSP IP/OBS HIGH 50: CPT | Performed by: INTERNAL MEDICINE

## 2025-01-20 ENCOUNTER — INPATIENT HOSPITAL (OUTPATIENT)
Dept: URBAN - METROPOLITAN AREA HOSPITAL 107 | Facility: HOSPITAL | Age: 60
End: 2025-01-20
Payer: COMMERCIAL

## 2025-01-20 DIAGNOSIS — K92.0 HEMATEMESIS: ICD-10-CM

## 2025-01-20 PROCEDURE — 99232 SBSQ HOSP IP/OBS MODERATE 35: CPT | Performed by: INTERNAL MEDICINE

## 2025-01-21 ENCOUNTER — INPATIENT HOSPITAL (OUTPATIENT)
Dept: URBAN - METROPOLITAN AREA HOSPITAL 107 | Facility: HOSPITAL | Age: 60
End: 2025-01-21
Payer: COMMERCIAL

## 2025-01-21 DIAGNOSIS — D50.9 IRON DEFICIENCY ANEMIA, UNSPECIFIED: ICD-10-CM

## 2025-01-21 DIAGNOSIS — K92.1 MELENA: ICD-10-CM

## 2025-01-21 DIAGNOSIS — D69.6 THROMBOCYTOPENIA, UNSPECIFIED: ICD-10-CM

## 2025-01-21 DIAGNOSIS — R10.12 LEFT UPPER QUADRANT PAIN: ICD-10-CM

## 2025-01-21 DIAGNOSIS — K52.9 NONINFECTIVE GASTROENTERITIS AND COLITIS, UNSPECIFIED: ICD-10-CM

## 2025-01-21 PROCEDURE — 99233 SBSQ HOSP IP/OBS HIGH 50: CPT | Performed by: PHYSICIAN ASSISTANT

## 2025-01-22 ENCOUNTER — INPATIENT HOSPITAL (OUTPATIENT)
Dept: URBAN - METROPOLITAN AREA HOSPITAL 107 | Facility: HOSPITAL | Age: 60
End: 2025-01-22
Payer: COMMERCIAL

## 2025-01-22 DIAGNOSIS — K92.1 MELENA: ICD-10-CM

## 2025-01-22 PROCEDURE — 45330 DIAGNOSTIC SIGMOIDOSCOPY: CPT | Performed by: INTERNAL MEDICINE

## 2025-02-08 ENCOUNTER — INPATIENT HOSPITAL (OUTPATIENT)
Dept: URBAN - METROPOLITAN AREA HOSPITAL 107 | Facility: HOSPITAL | Age: 60
End: 2025-02-08
Payer: COMMERCIAL

## 2025-02-08 DIAGNOSIS — R19.5 OTHER FECAL ABNORMALITIES: ICD-10-CM

## 2025-02-08 PROCEDURE — 99232 SBSQ HOSP IP/OBS MODERATE 35: CPT | Performed by: INTERNAL MEDICINE

## 2025-02-09 ENCOUNTER — INPATIENT HOSPITAL (OUTPATIENT)
Dept: URBAN - METROPOLITAN AREA HOSPITAL 107 | Facility: HOSPITAL | Age: 60
End: 2025-02-09
Payer: COMMERCIAL

## 2025-02-09 DIAGNOSIS — R19.5 OTHER FECAL ABNORMALITIES: ICD-10-CM

## 2025-02-09 PROCEDURE — 99232 SBSQ HOSP IP/OBS MODERATE 35: CPT | Performed by: INTERNAL MEDICINE
